# Patient Record
Sex: MALE | Race: WHITE | Employment: PART TIME | URBAN - METROPOLITAN AREA
[De-identification: names, ages, dates, MRNs, and addresses within clinical notes are randomized per-mention and may not be internally consistent; named-entity substitution may affect disease eponyms.]

---
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
## 2020-01-03 ENCOUNTER — ANESTHESIA (OUTPATIENT)
Dept: OPERATING ROOM | Age: 20
DRG: 482 | End: 2020-01-03
Payer: COMMERCIAL

## 2020-01-03 ENCOUNTER — ANESTHESIA EVENT (OUTPATIENT)
Dept: OPERATING ROOM | Age: 20
DRG: 482 | End: 2020-01-03
Payer: COMMERCIAL

## 2020-01-03 ENCOUNTER — TELEPHONE (OUTPATIENT)
Dept: ORTHOPEDIC SURGERY | Age: 20
End: 2020-01-03

## 2020-01-03 ENCOUNTER — APPOINTMENT (OUTPATIENT)
Dept: GENERAL RADIOLOGY | Age: 20
DRG: 482 | End: 2020-01-03
Payer: COMMERCIAL

## 2020-01-03 ENCOUNTER — HOSPITAL ENCOUNTER (INPATIENT)
Age: 20
LOS: 1 days | Discharge: HOME | DRG: 482 | End: 2020-01-04
Attending: EMERGENCY MEDICINE | Admitting: INTERNAL MEDICINE
Payer: COMMERCIAL

## 2020-01-03 VITALS — SYSTOLIC BLOOD PRESSURE: 101 MMHG | OXYGEN SATURATION: 100 % | TEMPERATURE: 76.5 F | DIASTOLIC BLOOD PRESSURE: 51 MMHG

## 2020-01-03 PROBLEM — S72.002B: Status: ACTIVE | Noted: 2020-01-03

## 2020-01-03 LAB
ABO/RH: NORMAL
ANION GAP SERPL CALCULATED.3IONS-SCNC: 11 MMOL/L (ref 3–16)
ANTIBODY SCREEN: NORMAL
BASOPHILS ABSOLUTE: 0.1 K/UL (ref 0–0.2)
BASOPHILS RELATIVE PERCENT: 0.5 %
BUN BLDV-MCNC: 21 MG/DL (ref 7–20)
CALCIUM SERPL-MCNC: 8.7 MG/DL (ref 8.3–10.6)
CHLORIDE BLD-SCNC: 102 MMOL/L (ref 99–110)
CO2: 24 MMOL/L (ref 21–32)
CREAT SERPL-MCNC: 0.7 MG/DL (ref 0.9–1.3)
EOSINOPHILS ABSOLUTE: 0.3 K/UL (ref 0–0.6)
EOSINOPHILS RELATIVE PERCENT: 2.4 %
GFR AFRICAN AMERICAN: >60
GFR NON-AFRICAN AMERICAN: >60
GLUCOSE BLD-MCNC: 107 MG/DL (ref 70–99)
GLUCOSE BLD-MCNC: 123 MG/DL (ref 70–99)
HCT VFR BLD CALC: 39.4 % (ref 40.5–52.5)
HEMOGLOBIN: 13.2 G/DL (ref 13.5–17.5)
LYMPHOCYTES ABSOLUTE: 1.6 K/UL (ref 1–5.1)
LYMPHOCYTES RELATIVE PERCENT: 13.4 %
MCH RBC QN AUTO: 29.5 PG (ref 26–34)
MCHC RBC AUTO-ENTMCNC: 33.5 G/DL (ref 31–36)
MCV RBC AUTO: 88 FL (ref 80–100)
MONOCYTES ABSOLUTE: 0.7 K/UL (ref 0–1.3)
MONOCYTES RELATIVE PERCENT: 6 %
NEUTROPHILS ABSOLUTE: 9.4 K/UL (ref 1.7–7.7)
NEUTROPHILS RELATIVE PERCENT: 77.7 %
PDW BLD-RTO: 13.9 % (ref 12.4–15.4)
PERFORMED ON: ABNORMAL
PLATELET # BLD: 242 K/UL (ref 135–450)
PMV BLD AUTO: 8.6 FL (ref 5–10.5)
POTASSIUM SERPL-SCNC: 3.6 MMOL/L (ref 3.5–5.1)
RBC # BLD: 4.48 M/UL (ref 4.2–5.9)
SODIUM BLD-SCNC: 137 MMOL/L (ref 136–145)
WBC # BLD: 12.1 K/UL (ref 4–11)

## 2020-01-03 PROCEDURE — 6360000002 HC RX W HCPCS: Performed by: FAMILY MEDICINE

## 2020-01-03 PROCEDURE — 2580000003 HC RX 258: Performed by: NURSE PRACTITIONER

## 2020-01-03 PROCEDURE — 99253 IP/OBS CNSLTJ NEW/EST LOW 45: CPT | Performed by: ORTHOPAEDIC SURGERY

## 2020-01-03 PROCEDURE — 2500000003 HC RX 250 WO HCPCS: Performed by: ORTHOPAEDIC SURGERY

## 2020-01-03 PROCEDURE — 2500000003 HC RX 250 WO HCPCS: Performed by: ANESTHESIOLOGY

## 2020-01-03 PROCEDURE — 6360000002 HC RX W HCPCS: Performed by: ORTHOPAEDIC SURGERY

## 2020-01-03 PROCEDURE — 1200000000 HC SEMI PRIVATE

## 2020-01-03 PROCEDURE — 36415 COLL VENOUS BLD VENIPUNCTURE: CPT

## 2020-01-03 PROCEDURE — 73552 X-RAY EXAM OF FEMUR 2/>: CPT

## 2020-01-03 PROCEDURE — 2580000003 HC RX 258: Performed by: ORTHOPAEDIC SURGERY

## 2020-01-03 PROCEDURE — 27506 TREATMENT OF THIGH FRACTURE: CPT | Performed by: ORTHOPAEDIC SURGERY

## 2020-01-03 PROCEDURE — C1769 GUIDE WIRE: HCPCS | Performed by: ORTHOPAEDIC SURGERY

## 2020-01-03 PROCEDURE — 2580000003 HC RX 258: Performed by: INTERNAL MEDICINE

## 2020-01-03 PROCEDURE — 3600000014 HC SURGERY LEVEL 4 ADDTL 15MIN: Performed by: ORTHOPAEDIC SURGERY

## 2020-01-03 PROCEDURE — 80048 BASIC METABOLIC PNL TOTAL CA: CPT

## 2020-01-03 PROCEDURE — 6370000000 HC RX 637 (ALT 250 FOR IP): Performed by: FAMILY MEDICINE

## 2020-01-03 PROCEDURE — 6370000000 HC RX 637 (ALT 250 FOR IP): Performed by: ORTHOPAEDIC SURGERY

## 2020-01-03 PROCEDURE — 86901 BLOOD TYPING SEROLOGIC RH(D): CPT

## 2020-01-03 PROCEDURE — 2709999900 HC NON-CHARGEABLE SUPPLY: Performed by: ORTHOPAEDIC SURGERY

## 2020-01-03 PROCEDURE — 0QS906Z REPOSITION LEFT FEMORAL SHAFT WITH INTRAMEDULLARY INTERNAL FIXATION DEVICE, OPEN APPROACH: ICD-10-PCS | Performed by: ORTHOPAEDIC SURGERY

## 2020-01-03 PROCEDURE — 85025 COMPLETE CBC W/AUTO DIFF WBC: CPT

## 2020-01-03 PROCEDURE — 2580000003 HC RX 258: Performed by: NURSE ANESTHETIST, CERTIFIED REGISTERED

## 2020-01-03 PROCEDURE — 99285 EMERGENCY DEPT VISIT HI MDM: CPT

## 2020-01-03 PROCEDURE — 86900 BLOOD TYPING SEROLOGIC ABO: CPT

## 2020-01-03 PROCEDURE — 7100000000 HC PACU RECOVERY - FIRST 15 MIN: Performed by: ORTHOPAEDIC SURGERY

## 2020-01-03 PROCEDURE — 2500000003 HC RX 250 WO HCPCS: Performed by: NURSE ANESTHETIST, CERTIFIED REGISTERED

## 2020-01-03 PROCEDURE — 2720000010 HC SURG SUPPLY STERILE: Performed by: ORTHOPAEDIC SURGERY

## 2020-01-03 PROCEDURE — 6360000002 HC RX W HCPCS: Performed by: NURSE ANESTHETIST, CERTIFIED REGISTERED

## 2020-01-03 PROCEDURE — 96374 THER/PROPH/DIAG INJ IV PUSH: CPT

## 2020-01-03 PROCEDURE — 3700000001 HC ADD 15 MINUTES (ANESTHESIA): Performed by: ORTHOPAEDIC SURGERY

## 2020-01-03 PROCEDURE — C1713 ANCHOR/SCREW BN/BN,TIS/BN: HCPCS | Performed by: ORTHOPAEDIC SURGERY

## 2020-01-03 PROCEDURE — 6360000002 HC RX W HCPCS

## 2020-01-03 PROCEDURE — 2780000010 HC IMPLANT OTHER: Performed by: ORTHOPAEDIC SURGERY

## 2020-01-03 PROCEDURE — 3600000004 HC SURGERY LEVEL 4 BASE: Performed by: ORTHOPAEDIC SURGERY

## 2020-01-03 PROCEDURE — 6360000002 HC RX W HCPCS: Performed by: NURSE PRACTITIONER

## 2020-01-03 PROCEDURE — 6360000002 HC RX W HCPCS: Performed by: ANESTHESIOLOGY

## 2020-01-03 PROCEDURE — 3209999900 FLUORO FOR SURGICAL PROCEDURES

## 2020-01-03 PROCEDURE — 86850 RBC ANTIBODY SCREEN: CPT

## 2020-01-03 PROCEDURE — 96375 TX/PRO/DX INJ NEW DRUG ADDON: CPT

## 2020-01-03 PROCEDURE — 7100000001 HC PACU RECOVERY - ADDTL 15 MIN: Performed by: ORTHOPAEDIC SURGERY

## 2020-01-03 PROCEDURE — 6360000002 HC RX W HCPCS: Performed by: INTERNAL MEDICINE

## 2020-01-03 PROCEDURE — 3700000000 HC ANESTHESIA ATTENDED CARE: Performed by: ORTHOPAEDIC SURGERY

## 2020-01-03 PROCEDURE — 73502 X-RAY EXAM HIP UNI 2-3 VIEWS: CPT

## 2020-01-03 DEVICE — SCREW BNE L70MM DIA5MM COR DIA4.3MM TIB TI ALUM NIOBIUM: Type: IMPLANTABLE DEVICE | Site: FEMUR | Status: FUNCTIONAL

## 2020-01-03 DEVICE — SCREW BNE L38MM DIA5MM TIB LT GRN TI ST CANN LOK FULL THRD: Type: IMPLANTABLE DEVICE | Site: FEMUR | Status: FUNCTIONAL

## 2020-01-03 DEVICE — SCREW BNE L48MM DIA5MM LAT FEM LT GRN TI ST LOK FULL THRD: Type: IMPLANTABLE DEVICE | Site: FEMUR | Status: FUNCTIONAL

## 2020-01-03 DEVICE — SCREW BNE L44MM DIA5MM COR DIA4.3MM TIB LT GRN TI ALLY ST: Type: IMPLANTABLE DEVICE | Site: FEMUR | Status: FUNCTIONAL

## 2020-01-03 RX ORDER — 0.9 % SODIUM CHLORIDE 0.9 %
500 INTRAVENOUS SOLUTION INTRAVENOUS
Status: DISCONTINUED | OUTPATIENT
Start: 2020-01-03 | End: 2020-01-03 | Stop reason: HOSPADM

## 2020-01-03 RX ORDER — ACETAMINOPHEN 500 MG
1000 TABLET ORAL EVERY 8 HOURS SCHEDULED
Status: DISCONTINUED | OUTPATIENT
Start: 2020-01-03 | End: 2020-01-04 | Stop reason: HOSPADM

## 2020-01-03 RX ORDER — PROCHLORPERAZINE EDISYLATE 5 MG/ML
5 INJECTION INTRAMUSCULAR; INTRAVENOUS
Status: DISCONTINUED | OUTPATIENT
Start: 2020-01-03 | End: 2020-01-03 | Stop reason: HOSPADM

## 2020-01-03 RX ORDER — MORPHINE SULFATE 2 MG/ML
2 INJECTION, SOLUTION INTRAMUSCULAR; INTRAVENOUS EVERY 4 HOURS PRN
Status: DISCONTINUED | OUTPATIENT
Start: 2020-01-03 | End: 2020-01-03 | Stop reason: SDUPTHER

## 2020-01-03 RX ORDER — GLYCOPYRROLATE 1 MG/5 ML
SYRINGE (ML) INTRAVENOUS PRN
Status: DISCONTINUED | OUTPATIENT
Start: 2020-01-03 | End: 2020-01-03 | Stop reason: SDUPTHER

## 2020-01-03 RX ORDER — MEPERIDINE HYDROCHLORIDE 25 MG/ML
12.5 INJECTION INTRAMUSCULAR; INTRAVENOUS; SUBCUTANEOUS EVERY 5 MIN PRN
Status: DISCONTINUED | OUTPATIENT
Start: 2020-01-03 | End: 2020-01-03 | Stop reason: HOSPADM

## 2020-01-03 RX ORDER — MAGNESIUM HYDROXIDE 1200 MG/15ML
LIQUID ORAL CONTINUOUS PRN
Status: COMPLETED | OUTPATIENT
Start: 2020-01-03 | End: 2020-01-03

## 2020-01-03 RX ORDER — CYCLOBENZAPRINE HCL 10 MG
10 TABLET ORAL 3 TIMES DAILY PRN
Status: DISCONTINUED | OUTPATIENT
Start: 2020-01-03 | End: 2020-01-04 | Stop reason: HOSPADM

## 2020-01-03 RX ORDER — SODIUM CHLORIDE 0.9 % (FLUSH) 0.9 %
10 SYRINGE (ML) INJECTION PRN
Status: DISCONTINUED | OUTPATIENT
Start: 2020-01-03 | End: 2020-01-04 | Stop reason: HOSPADM

## 2020-01-03 RX ORDER — LIDOCAINE HYDROCHLORIDE 20 MG/ML
INJECTION, SOLUTION INTRAVENOUS PRN
Status: DISCONTINUED | OUTPATIENT
Start: 2020-01-03 | End: 2020-01-03 | Stop reason: SDUPTHER

## 2020-01-03 RX ORDER — KETOROLAC TROMETHAMINE 15 MG/ML
15 INJECTION, SOLUTION INTRAMUSCULAR; INTRAVENOUS EVERY 6 HOURS
Status: DISCONTINUED | OUTPATIENT
Start: 2020-01-03 | End: 2020-01-04 | Stop reason: HOSPADM

## 2020-01-03 RX ORDER — MORPHINE SULFATE 2 MG/ML
4 INJECTION, SOLUTION INTRAMUSCULAR; INTRAVENOUS EVERY 4 HOURS PRN
Status: DISCONTINUED | OUTPATIENT
Start: 2020-01-03 | End: 2020-01-03

## 2020-01-03 RX ORDER — OXYCODONE HYDROCHLORIDE 5 MG/1
5 TABLET ORAL EVERY 4 HOURS PRN
Status: DISCONTINUED | OUTPATIENT
Start: 2020-01-03 | End: 2020-01-04 | Stop reason: HOSPADM

## 2020-01-03 RX ORDER — OXYCODONE HYDROCHLORIDE 5 MG/1
10 TABLET ORAL EVERY 4 HOURS PRN
Status: DISCONTINUED | OUTPATIENT
Start: 2020-01-03 | End: 2020-01-04 | Stop reason: HOSPADM

## 2020-01-03 RX ORDER — VECURONIUM BROMIDE 1 MG/ML
INJECTION, POWDER, LYOPHILIZED, FOR SOLUTION INTRAVENOUS PRN
Status: DISCONTINUED | OUTPATIENT
Start: 2020-01-03 | End: 2020-01-03 | Stop reason: SDUPTHER

## 2020-01-03 RX ORDER — ROCURONIUM BROMIDE 10 MG/ML
INJECTION, SOLUTION INTRAVENOUS PRN
Status: DISCONTINUED | OUTPATIENT
Start: 2020-01-03 | End: 2020-01-03 | Stop reason: SDUPTHER

## 2020-01-03 RX ORDER — FENTANYL CITRATE 50 UG/ML
50 INJECTION, SOLUTION INTRAMUSCULAR; INTRAVENOUS ONCE
Status: COMPLETED | OUTPATIENT
Start: 2020-01-03 | End: 2020-01-03

## 2020-01-03 RX ORDER — MORPHINE SULFATE 2 MG/ML
4 INJECTION, SOLUTION INTRAMUSCULAR; INTRAVENOUS
Status: DISCONTINUED | OUTPATIENT
Start: 2020-01-03 | End: 2020-01-03

## 2020-01-03 RX ORDER — PROPOFOL 10 MG/ML
INJECTION, EMULSION INTRAVENOUS PRN
Status: DISCONTINUED | OUTPATIENT
Start: 2020-01-03 | End: 2020-01-03 | Stop reason: SDUPTHER

## 2020-01-03 RX ORDER — MORPHINE SULFATE 2 MG/ML
2 INJECTION, SOLUTION INTRAMUSCULAR; INTRAVENOUS
Status: DISCONTINUED | OUTPATIENT
Start: 2020-01-03 | End: 2020-01-03

## 2020-01-03 RX ORDER — MORPHINE SULFATE 2 MG/ML
2 INJECTION, SOLUTION INTRAMUSCULAR; INTRAVENOUS EVERY 4 HOURS PRN
Status: DISCONTINUED | OUTPATIENT
Start: 2020-01-03 | End: 2020-01-03

## 2020-01-03 RX ORDER — HYDROCODONE BITARTRATE AND ACETAMINOPHEN 5; 325 MG/1; MG/1
1 TABLET ORAL
Status: DISCONTINUED | OUTPATIENT
Start: 2020-01-03 | End: 2020-01-03 | Stop reason: HOSPADM

## 2020-01-03 RX ORDER — HYDRALAZINE HYDROCHLORIDE 20 MG/ML
5 INJECTION INTRAMUSCULAR; INTRAVENOUS EVERY 10 MIN PRN
Status: DISCONTINUED | OUTPATIENT
Start: 2020-01-03 | End: 2020-01-03 | Stop reason: HOSPADM

## 2020-01-03 RX ORDER — MORPHINE SULFATE 2 MG/ML
4 INJECTION, SOLUTION INTRAMUSCULAR; INTRAVENOUS EVERY 4 HOURS PRN
Status: DISCONTINUED | OUTPATIENT
Start: 2020-01-03 | End: 2020-01-03 | Stop reason: SDUPTHER

## 2020-01-03 RX ORDER — NEOSTIGMINE METHYLSULFATE 5 MG/5 ML
SYRINGE (ML) INTRAVENOUS PRN
Status: DISCONTINUED | OUTPATIENT
Start: 2020-01-03 | End: 2020-01-03 | Stop reason: SDUPTHER

## 2020-01-03 RX ORDER — ONDANSETRON 2 MG/ML
INJECTION INTRAMUSCULAR; INTRAVENOUS PRN
Status: DISCONTINUED | OUTPATIENT
Start: 2020-01-03 | End: 2020-01-03 | Stop reason: SDUPTHER

## 2020-01-03 RX ORDER — ONDANSETRON 2 MG/ML
4 INJECTION INTRAMUSCULAR; INTRAVENOUS
Status: DISCONTINUED | OUTPATIENT
Start: 2020-01-03 | End: 2020-01-03 | Stop reason: HOSPADM

## 2020-01-03 RX ORDER — SODIUM CHLORIDE 0.9 % (FLUSH) 0.9 %
10 SYRINGE (ML) INJECTION EVERY 12 HOURS SCHEDULED
Status: DISCONTINUED | OUTPATIENT
Start: 2020-01-03 | End: 2020-01-04 | Stop reason: HOSPADM

## 2020-01-03 RX ORDER — HYDROMORPHONE HCL 110MG/55ML
PATIENT CONTROLLED ANALGESIA SYRINGE INTRAVENOUS PRN
Status: DISCONTINUED | OUTPATIENT
Start: 2020-01-03 | End: 2020-01-03 | Stop reason: SDUPTHER

## 2020-01-03 RX ORDER — ONDANSETRON 2 MG/ML
4 INJECTION INTRAMUSCULAR; INTRAVENOUS EVERY 6 HOURS PRN
Status: DISCONTINUED | OUTPATIENT
Start: 2020-01-03 | End: 2020-01-04 | Stop reason: HOSPADM

## 2020-01-03 RX ORDER — DEXAMETHASONE SODIUM PHOSPHATE 4 MG/ML
INJECTION, SOLUTION INTRA-ARTICULAR; INTRALESIONAL; INTRAMUSCULAR; INTRAVENOUS; SOFT TISSUE PRN
Status: DISCONTINUED | OUTPATIENT
Start: 2020-01-03 | End: 2020-01-03 | Stop reason: SDUPTHER

## 2020-01-03 RX ORDER — TRAMADOL HYDROCHLORIDE 50 MG/1
50 TABLET ORAL EVERY 4 HOURS PRN
Status: DISCONTINUED | OUTPATIENT
Start: 2020-01-03 | End: 2020-01-04 | Stop reason: HOSPADM

## 2020-01-03 RX ORDER — SODIUM CHLORIDE, SODIUM LACTATE, POTASSIUM CHLORIDE, CALCIUM CHLORIDE 600; 310; 30; 20 MG/100ML; MG/100ML; MG/100ML; MG/100ML
INJECTION, SOLUTION INTRAVENOUS CONTINUOUS PRN
Status: DISCONTINUED | OUTPATIENT
Start: 2020-01-03 | End: 2020-01-03 | Stop reason: SDUPTHER

## 2020-01-03 RX ORDER — ACETAMINOPHEN 325 MG/1
650 TABLET ORAL EVERY 4 HOURS PRN
Status: DISCONTINUED | OUTPATIENT
Start: 2020-01-03 | End: 2020-01-03

## 2020-01-03 RX ORDER — DIPHENHYDRAMINE HYDROCHLORIDE 50 MG/ML
12.5 INJECTION INTRAMUSCULAR; INTRAVENOUS
Status: DISCONTINUED | OUTPATIENT
Start: 2020-01-03 | End: 2020-01-03 | Stop reason: HOSPADM

## 2020-01-03 RX ORDER — BUPIVACAINE HYDROCHLORIDE 5 MG/ML
INJECTION, SOLUTION EPIDURAL; INTRACAUDAL PRN
Status: DISCONTINUED | OUTPATIENT
Start: 2020-01-03 | End: 2020-01-03 | Stop reason: ALTCHOICE

## 2020-01-03 RX ADMIN — PROPOFOL 100 MG: 10 INJECTION, EMULSION INTRAVENOUS at 17:02

## 2020-01-03 RX ADMIN — PROPOFOL 200 MG: 10 INJECTION, EMULSION INTRAVENOUS at 16:59

## 2020-01-03 RX ADMIN — LIDOCAINE HYDROCHLORIDE 100 MG: 20 INJECTION, SOLUTION INTRAVENOUS at 16:59

## 2020-01-03 RX ADMIN — HYDROMORPHONE HYDROCHLORIDE 1 MG: 1 INJECTION, SOLUTION INTRAMUSCULAR; INTRAVENOUS; SUBCUTANEOUS at 23:11

## 2020-01-03 RX ADMIN — Medication 3 MG: at 19:37

## 2020-01-03 RX ADMIN — HYDROMORPHONE HYDROCHLORIDE 1 MG: 2 INJECTION, SOLUTION INTRAMUSCULAR; INTRAVENOUS; SUBCUTANEOUS at 16:53

## 2020-01-03 RX ADMIN — HYDROMORPHONE HYDROCHLORIDE 1 MG: 1 INJECTION, SOLUTION INTRAMUSCULAR; INTRAVENOUS; SUBCUTANEOUS at 12:29

## 2020-01-03 RX ADMIN — VECURONIUM BROMIDE FOR INJECTION 2 MG: 1 INJECTION, POWDER, LYOPHILIZED, FOR SOLUTION INTRAVENOUS at 17:20

## 2020-01-03 RX ADMIN — VECURONIUM BROMIDE FOR INJECTION 1 MG: 1 INJECTION, POWDER, LYOPHILIZED, FOR SOLUTION INTRAVENOUS at 17:47

## 2020-01-03 RX ADMIN — DEXAMETHASONE SODIUM PHOSPHATE 8 MG: 4 INJECTION, SOLUTION INTRAMUSCULAR; INTRAVENOUS at 17:05

## 2020-01-03 RX ADMIN — MORPHINE SULFATE 4 MG: 2 INJECTION, SOLUTION INTRAMUSCULAR; INTRAVENOUS at 06:30

## 2020-01-03 RX ADMIN — OXYCODONE 10 MG: 5 TABLET ORAL at 21:24

## 2020-01-03 RX ADMIN — HYDROMORPHONE HYDROCHLORIDE 0.5 MG: 1 INJECTION, SOLUTION INTRAMUSCULAR; INTRAVENOUS; SUBCUTANEOUS at 20:00

## 2020-01-03 RX ADMIN — Medication 10 ML: at 09:40

## 2020-01-03 RX ADMIN — SODIUM CHLORIDE, SODIUM LACTATE, POTASSIUM CHLORIDE, AND CALCIUM CHLORIDE: 600; 310; 30; 20 INJECTION, SOLUTION INTRAVENOUS at 16:53

## 2020-01-03 RX ADMIN — MORPHINE SULFATE 4 MG: 2 INJECTION, SOLUTION INTRAMUSCULAR; INTRAVENOUS at 02:30

## 2020-01-03 RX ADMIN — CYCLOBENZAPRINE 10 MG: 10 TABLET, FILM COATED ORAL at 10:44

## 2020-01-03 RX ADMIN — CEFAZOLIN 2 G: 1 INJECTION, POWDER, FOR SOLUTION INTRAMUSCULAR; INTRAVENOUS at 17:05

## 2020-01-03 RX ADMIN — ACETAMINOPHEN 1000 MG: 500 TABLET ORAL at 22:08

## 2020-01-03 RX ADMIN — Medication 1 MG: at 00:56

## 2020-01-03 RX ADMIN — TRANEXAMIC ACID 1 G: 1 INJECTION, SOLUTION INTRAVENOUS at 17:10

## 2020-01-03 RX ADMIN — HYDROMORPHONE HYDROCHLORIDE 1 MG: 1 INJECTION, SOLUTION INTRAMUSCULAR; INTRAVENOUS; SUBCUTANEOUS at 15:27

## 2020-01-03 RX ADMIN — ONDANSETRON 8 MG: 2 INJECTION INTRAMUSCULAR; INTRAVENOUS at 17:05

## 2020-01-03 RX ADMIN — ROCURONIUM BROMIDE 50 MG: 10 INJECTION, SOLUTION INTRAVENOUS at 16:59

## 2020-01-03 RX ADMIN — Medication 0.4 MG: at 19:37

## 2020-01-03 RX ADMIN — HYDROMORPHONE HYDROCHLORIDE 1 MG: 1 INJECTION, SOLUTION INTRAMUSCULAR; INTRAVENOUS; SUBCUTANEOUS at 00:56

## 2020-01-03 RX ADMIN — KETOROLAC TROMETHAMINE 15 MG: 15 INJECTION, SOLUTION INTRAMUSCULAR; INTRAVENOUS at 22:08

## 2020-01-03 RX ADMIN — Medication 10 ML: at 21:25

## 2020-01-03 RX ADMIN — MORPHINE SULFATE 4 MG: 2 INJECTION, SOLUTION INTRAMUSCULAR; INTRAVENOUS at 09:43

## 2020-01-03 RX ADMIN — FENTANYL CITRATE 50 MCG: 50 INJECTION, SOLUTION INTRAMUSCULAR; INTRAVENOUS at 00:23

## 2020-01-03 ASSESSMENT — PULMONARY FUNCTION TESTS
PIF_VALUE: 16
PIF_VALUE: 1
PIF_VALUE: 16
PIF_VALUE: 19
PIF_VALUE: 16
PIF_VALUE: 16
PIF_VALUE: 18
PIF_VALUE: 16
PIF_VALUE: 2
PIF_VALUE: 16
PIF_VALUE: 18
PIF_VALUE: 18
PIF_VALUE: 16
PIF_VALUE: 16
PIF_VALUE: 0
PIF_VALUE: 1
PIF_VALUE: 16
PIF_VALUE: 2
PIF_VALUE: 16
PIF_VALUE: 5
PIF_VALUE: 2
PIF_VALUE: 16
PIF_VALUE: 12
PIF_VALUE: 16
PIF_VALUE: 18
PIF_VALUE: 18
PIF_VALUE: 16
PIF_VALUE: 1
PIF_VALUE: 21
PIF_VALUE: 16
PIF_VALUE: 5
PIF_VALUE: 16
PIF_VALUE: 1
PIF_VALUE: 16
PIF_VALUE: 16
PIF_VALUE: 21
PIF_VALUE: 16
PIF_VALUE: 1
PIF_VALUE: 16
PIF_VALUE: 3
PIF_VALUE: 16
PIF_VALUE: 3
PIF_VALUE: 2
PIF_VALUE: 16
PIF_VALUE: 18
PIF_VALUE: 16
PIF_VALUE: 4
PIF_VALUE: 16
PIF_VALUE: 1
PIF_VALUE: 16
PIF_VALUE: 15
PIF_VALUE: 16
PIF_VALUE: 2
PIF_VALUE: 16
PIF_VALUE: 17
PIF_VALUE: 16
PIF_VALUE: 18
PIF_VALUE: 21
PIF_VALUE: 16
PIF_VALUE: 0
PIF_VALUE: 16
PIF_VALUE: 16
PIF_VALUE: 0
PIF_VALUE: 16
PIF_VALUE: 18
PIF_VALUE: 16
PIF_VALUE: 16
PIF_VALUE: 15
PIF_VALUE: 16
PIF_VALUE: 16

## 2020-01-03 ASSESSMENT — PAIN - FUNCTIONAL ASSESSMENT

## 2020-01-03 ASSESSMENT — PAIN DESCRIPTION - PAIN TYPE
TYPE: SURGICAL PAIN
TYPE: ACUTE PAIN
TYPE: SURGICAL PAIN
TYPE: ACUTE PAIN
TYPE: SURGICAL PAIN
TYPE: ACUTE PAIN

## 2020-01-03 ASSESSMENT — PAIN DESCRIPTION - PROGRESSION
CLINICAL_PROGRESSION: NOT CHANGED

## 2020-01-03 ASSESSMENT — PAIN SCALES - GENERAL
PAINLEVEL_OUTOF10: 6
PAINLEVEL_OUTOF10: 6
PAINLEVEL_OUTOF10: 0
PAINLEVEL_OUTOF10: 7
PAINLEVEL_OUTOF10: 8
PAINLEVEL_OUTOF10: 9
PAINLEVEL_OUTOF10: 9
PAINLEVEL_OUTOF10: 6
PAINLEVEL_OUTOF10: 0
PAINLEVEL_OUTOF10: 8

## 2020-01-03 ASSESSMENT — PAIN DESCRIPTION - FREQUENCY
FREQUENCY: CONTINUOUS

## 2020-01-03 ASSESSMENT — PAIN DESCRIPTION - ONSET
ONSET: ON-GOING
ONSET: SUDDEN
ONSET: ON-GOING

## 2020-01-03 ASSESSMENT — PAIN DESCRIPTION - ORIENTATION
ORIENTATION: LEFT

## 2020-01-03 ASSESSMENT — PAIN DESCRIPTION - LOCATION
LOCATION: HIP
LOCATION: HIP;LEG
LOCATION: HIP
LOCATION: LEG
LOCATION: HIP;LEG
LOCATION: HIP

## 2020-01-03 ASSESSMENT — PAIN DESCRIPTION - DESCRIPTORS
DESCRIPTORS: BURNING;SHARP
DESCRIPTORS: ACHING;SHARP
DESCRIPTORS: ACHING;SHARP
DESCRIPTORS: SHARP;SHOOTING
DESCRIPTORS: ACHING;SHARP
DESCRIPTORS: SHARP;TENDER
DESCRIPTORS: ACHING;SHARP
DESCRIPTORS: ACHING

## 2020-01-03 ASSESSMENT — LIFESTYLE VARIABLES: SMOKING_STATUS: 0

## 2020-01-03 NOTE — PROGRESS NOTES
Patient arrived from  to PACU for pre-op surgery. Awaiting 's arrival. Anesthesia spoke to patient and mother.

## 2020-01-03 NOTE — PROGRESS NOTES
4 Eyes Admission Assessment     I agree as the admission nurse that 2 RN's have performed a thorough Head to Toe Skin Assessment on the patient. ALL assessment sites listed below have been assessed on admission. Areas assessed by both nurses:   [x]   Head, Face, and Ears   [x]   Shoulders, Back, and Chest  [x]   Arms, Elbows, and Hands   [x]   Coccyx, Sacrum, and Ischum  [x]   Legs, Feet, and Heels        Does the Patient have Skin Breakdown?   No        Abrasion rt eye  Jagjit Prevention initiated:  No   Wound Care Orders initiated:  No      [facility] nurse consulted for Pressure Injury (Stage 3,4, Unstageable, DTI, NWPT, and Complex wounds):  No      Nurse 1 eSignature: Electronically signed by Tere Krishnamurthy RN on 1/3/20 at 4:20 AM    **SHARE this note so that the co-signing nurse is able to place an eSignature**    Nurse 2 eSignature: Electronically signed by Amalia Godwin RN on 1/3/20 at 4:21 AM

## 2020-01-03 NOTE — PROGRESS NOTES
Hospitalist follow up admit >12 am:  Chart reviewed. Pt. Is a 22 yo M who sustained a mid femur fracture after landing on his left leg after a fall. Ortho. Has been consulted and surgical intervention is recommended. Pain mgmt. Will cont. To monitor.   Carter Hernandes  Hospitalist.

## 2020-01-03 NOTE — TELEPHONE ENCOUNTER
Patients dad called this am re: Massiel Monsalve who is in the ER at [facility] Patient fractured femur-will require sx-Jose, the father requested to speak to Dr Laurie Rodriges who is a personal friend. Texted Dr Laurie Rodriges [facility] cell phone # [phone].

## 2020-01-03 NOTE — ED TRIAGE NOTES
Pt came in via squad. Pt was wrestling in the basement with a friend and landed on the ground./ Pt thought he heard a snap. Pt L leg is in 9 rated pain. Pt does  Have a pedal pulse.

## 2020-01-03 NOTE — PLAN OF CARE
Ortho POC for: Left femur fracture    HPI: The patient reports that last night he was at a friend's house who he was wrestling with in the basement. While wrestling, his friend flipped him over onto his left side and he immediately knee he had injured himself. He was unable to get up and had his friends call 911. He was brought here by squad. The patient is currently not in school, he works at a car wash. He is resting in bed, appears comfortable at this time. Reports pain is controlled intermittently. Father at bedside. Examination: Left thigh with swelling and taut. Compartments soft however. Deformity noted to left thigh. Tenderness with light palpation. LLE ER at rest with knee slightly flexed. NVI. Moving foot and ankle, good pedal pulse. Foot warm with good cap refill. Radiology:   XR HIP 2-3 VW W PELVIS LEFT   Final Result     Comminuted, displaced fractures of the mid left femur. Assessment: Left femur fracture    Plan: Our recommendation is for surgical intervention. Plan for OR at 36 with Dr. Cathy Kaiser for left femur IM nail. RN to verify consent, remain NPO, Ancef on call to OR. Medicine has cleared patient for OR. Discussed POC with pt and his father and all questions answered; they reported they had already reviewed imaging. Full consult note to follow. Discussed with Dr. Cathy Kaiser. All in agreement.     Linda Gibbs, CNP

## 2020-01-03 NOTE — H&P
Pulse 87   Temp 97.4 °F (36.3 °C) (Axillary)   Resp 16   SpO2 97%     General appearance:  Appears comfortable. Well nourished  Eyes: Sclera clear, pupils equal  ENT: Moist mucus membranes, no thrush. Trachea midline. Cardiovascular: Regular rhythm, normal S1, S2. No murmur, gallop, rub. No edema in lower extremities  Respiratory: Clear to auscultation bilaterally, no wheeze, good inspiratory effort  Gastrointestinal: Abdomen soft, non-tender, not distended, normal bowel sounds  Musculoskeletal: No cyanosis in digits, neck supple  Neurology: Cranial nerves grossly intact. Alert and oriented in time, place and person. No speech or motor deficits  Psychiatry: Appropriate affect. Not agitated  Skin: Warm, dry, normal turgor, no rash  Brisk capillary refill, peripheral pulses palpable   Labs:  CBC:   Lab Results   Component Value Date    WBC 12.1 01/03/2020    RBC 4.48 01/03/2020    HGB 13.2 01/03/2020    HCT 39.4 01/03/2020    MCV 88.0 01/03/2020    MCH 29.5 01/03/2020    MCHC 33.5 01/03/2020    RDW 13.9 01/03/2020     01/03/2020    MPV 8.6 01/03/2020     BMP:    Lab Results   Component Value Date     01/03/2020    K 3.6 01/03/2020     01/03/2020    CO2 24 01/03/2020    BUN 21 01/03/2020    CREATININE 0.7 01/03/2020    CALCIUM 8.7 01/03/2020    GFRAA >60 01/03/2020    LABGLOM >60 01/03/2020    GLUCOSE 123 01/03/2020     XR HIP 2-3 VW W PELVIS LEFT   Final Result     Comminuted, displaced fractures of the mid left femur.             Discussed case  with ER provider over the phone and in person  Problem List  Left femur fracture  Leukocytosis    Assessment/Plan:   Comminuted displaced mid left femur fracture  N.p.o., PRN IV opioids, Ortho consult, Pierson's traction with 15 pounds, no contraindications to surgery      DVT prophylaxis low risk, SCDs  Code status full code  Diet n.p.o.  IV access peripheral  Mayorga Catheter no    Admit as inpatient/. I anticipate hospitalization spanning more than two

## 2020-01-03 NOTE — ED NOTES
Bed: A03-03  Expected date:   Expected time:   Means of arrival:   Comments:  Iris Gutierrez RN  01/03/20 8115

## 2020-01-03 NOTE — ED PROVIDER NOTES
murmurs, rubs, gallops     Abdomen: No gross distension. Soft, non-tender, non-peritoneal.     : Deferred. Musculoskeletal: No obvious joint deformity. Ambulates without difficulty. Patient is laying on his right side and is somewhat difficult to assess, though does have an apparent deformity to left mid thigh. Difficult to ascertain whether the leg is malrotated or foreshortened. He will not allow me to move the leg. He does have strong and equal DP and PT pulses bilaterally. Neuro: A&O x 4. Normal speech without dysarthria or aphasia. Moves all extremities spontaneously and symmetrically. Gait normal without ataxia. Skin: Warm, dry. No obvious rashes, petechiae, or purpura. Psych: Appropriate mood and affect, normal interaction. Diagnostic Results     RADIOLOGY:  XR HIP 2-3 VW W PELVIS LEFT    (Results Pending)     LABS:   No results found for this visit on 01/03/20. RECENT VITALS:  BP: 109/63, Temp: 97.3 °F (36.3 °C), Pulse: 87, Resp: 18, SpO2: 95 %     Procedures     None     ED Course     Nursing Notes, Past Medical Hx, Past Surgical Hx, Social Hx, Allergies, and Family Hx were reviewed. The patient was given the following medications:  Orders Placed This Encounter   Medications    fentaNYL (SUBLIMAZE) injection 50 mcg    HYDROmorphone (DILAUDID) injection 1 mg    HYDROmorphone (DILAUDID) 1 MG/ML injection     Christie Barboza: cabinet override            CONSULTS:  Teagan Noel4 / ASSESSMENT / PLAN     Briefly, Irene Gonzalez is a 23 y.o. male who presented to the emergency department with leg pain. On.  He arrives via EMS. On presentation, he is uncomfortable appearing, in mild distress. He is afebrile with stable vital signs. On exam, does appear to have a deformity to the left lateral mid thigh. He is neurovascularly intact distally.   Cannot ascertain whether the leg is foreshortened or malrotated as the patient will not allow me to move or further assess the injury. X-rays of the femur, hip, and pelvis were obtained which showed comminuted, significantly displaced midshaft femur fracture. 50 fentanyl was given initially for pain subsequently required 1 mg Dilaudid to facilitate his imaging. I spoke with Dr. Spencer Li of orthopedics who would like patient to be placed in Pierson's traction with 15 pounds admitted to the hospitalist.  He will see him tomorrow. I sent basic labs and type and screen. I spoke with the hospitalist for admission. At this point in time, patient will require admission to the hospital for further management of their clinical condition. I spoke with the admitting team who is in agreement with plan for admission. Patient and family are in agreement with plan for admission. Patient will be cared for in the ED prior to a bed becoming available upstairs    The patient was evaluated by myself and the ED Attending Physician, Dr. Aris Reddy. All management and disposition plans were discussed and agreed upon. Clinical Impression     1.  Closed displaced comminuted fracture of shaft of left femur, initial encounter [facility])      Disposition     Admission    Tae Potts CNP  Department of Emergency Medicine     LETTY Estrada - [facility]  01/03/20 1371

## 2020-01-03 NOTE — ANESTHESIA PRE PROCEDURE
DO        HYDROcodone-acetaminophen (NORCO) 5-325 MG per tablet 1 tablet  1 tablet Oral Once PRN Sherra Elise, DO        diphenhydrAMINE (BENADRYL) injection 12.5 mg  12.5 mg Intravenous Once PRN Sherra Elise, DO        0.9 % sodium chloride bolus  500 mL Intravenous Once PRN Sherra Elise, DO        ondansetron [facility]) injection 4 mg  4 mg Intravenous Once PRN Sherra Elise, DO        prochlorperazine (COMPAZINE) injection 5 mg  5 mg Intravenous Once PRN Sherra Elise, DO        hydrALAZINE (APRESOLINE) injection 5 mg  5 mg Intravenous Q10 Min PRN Sherra Elise, DO        meperidine (DEMEROL) injection 12.5 mg  12.5 mg Intravenous Q5 Min PRN Sherra Elise, DO           Allergies: Allergies   Allergen Reactions    Penicillins        Problem List:    Patient Active Problem List   Diagnosis Code    Hip fracture, left, open type I or II, [facility] S72.002B       Past Medical History:  History reviewed. No pertinent past medical history. Past Surgical History:  History reviewed. No pertinent surgical history.     Social History:    Social History     Tobacco Use    Smoking status: Never Smoker   Substance Use Topics    Alcohol use: Yes     Comment: occassional                                Counseling given: Not Answered      Vital Signs (Current):   Vitals:    01/03/20 0630 01/03/20 0645 01/03/20 1040 01/03/20 1525   BP:  122/66 (!) 144/87 (!) 142/88   Pulse:  70 68 72   Resp:  16 16 16   Temp:  98.2 °F (36.8 °C) 98.8 °F (37.1 °C) 98.1 °F (36.7 °C)   TempSrc:  Axillary Oral Oral   SpO2:  100% 95% 98%   Weight: 135 lb (61.2 kg)      Height: 5' 10\" (1.778 m)                                                 BP Readings from Last 3 Encounters:   01/03/20 (!) 142/88       NPO Status: Time of last liquid consumption: 2350                        Time of last solid consumption: 1509                        Date of last liquid consumption: 01/02/20                        Date

## 2020-01-03 NOTE — PLAN OF CARE
Problem: Falls - Risk of:  Goal: Will remain free from falls  Outcome: Ongoing  Note:   Pt is a medium fall risk. Fall precautions in place: Bed in locked lowest position, call light in reach, and non skid socks on. Will continue to monitor for patient safety. Problem: Pain:  Goal: Pain level will decrease  Note:   Pt complains of left leg pain. PRN Morphine given. Pt resting comfortably at this time. Will continue to monitor.

## 2020-01-04 VITALS
RESPIRATION RATE: 18 BRPM | DIASTOLIC BLOOD PRESSURE: 62 MMHG | OXYGEN SATURATION: 94 % | TEMPERATURE: 98.6 F | BODY MASS INDEX: 19.33 KG/M2 | SYSTOLIC BLOOD PRESSURE: 114 MMHG | HEART RATE: 87 BPM | WEIGHT: 135 LBS | HEIGHT: 70 IN

## 2020-01-04 LAB
ANION GAP SERPL CALCULATED.3IONS-SCNC: 10 MMOL/L (ref 3–16)
BUN BLDV-MCNC: 12 MG/DL (ref 7–20)
CALCIUM SERPL-MCNC: 8.9 MG/DL (ref 8.3–10.6)
CHLORIDE BLD-SCNC: 99 MMOL/L (ref 99–110)
CO2: 27 MMOL/L (ref 21–32)
CREAT SERPL-MCNC: 0.7 MG/DL (ref 0.9–1.3)
GFR AFRICAN AMERICAN: >60
GFR NON-AFRICAN AMERICAN: >60
GLUCOSE BLD-MCNC: 138 MG/DL (ref 70–99)
HCT VFR BLD CALC: 29.3 % (ref 40.5–52.5)
HEMOGLOBIN: 10.1 G/DL (ref 13.5–17.5)
MCH RBC QN AUTO: 30.2 PG (ref 26–34)
MCHC RBC AUTO-ENTMCNC: 34.3 G/DL (ref 31–36)
MCV RBC AUTO: 88.2 FL (ref 80–100)
PDW BLD-RTO: 13.3 % (ref 12.4–15.4)
PLATELET # BLD: 184 K/UL (ref 135–450)
PMV BLD AUTO: 8.6 FL (ref 5–10.5)
POTASSIUM REFLEX MAGNESIUM: 4.7 MMOL/L (ref 3.5–5.1)
RBC # BLD: 3.33 M/UL (ref 4.2–5.9)
SODIUM BLD-SCNC: 136 MMOL/L (ref 136–145)
WBC # BLD: 10.2 K/UL (ref 4–11)

## 2020-01-04 PROCEDURE — 2580000003 HC RX 258: Performed by: ORTHOPAEDIC SURGERY

## 2020-01-04 PROCEDURE — 97530 THERAPEUTIC ACTIVITIES: CPT

## 2020-01-04 PROCEDURE — 97166 OT EVAL MOD COMPLEX 45 MIN: CPT

## 2020-01-04 PROCEDURE — 6360000002 HC RX W HCPCS: Performed by: ORTHOPAEDIC SURGERY

## 2020-01-04 PROCEDURE — 80048 BASIC METABOLIC PNL TOTAL CA: CPT

## 2020-01-04 PROCEDURE — 97116 GAIT TRAINING THERAPY: CPT

## 2020-01-04 PROCEDURE — 99024 POSTOP FOLLOW-UP VISIT: CPT | Performed by: ORTHOPAEDIC SURGERY

## 2020-01-04 PROCEDURE — 2580000003 HC RX 258

## 2020-01-04 PROCEDURE — 97535 SELF CARE MNGMENT TRAINING: CPT

## 2020-01-04 PROCEDURE — 6370000000 HC RX 637 (ALT 250 FOR IP): Performed by: ORTHOPAEDIC SURGERY

## 2020-01-04 PROCEDURE — 85027 COMPLETE CBC AUTOMATED: CPT

## 2020-01-04 RX ORDER — SODIUM CHLORIDE 9 MG/ML
INJECTION, SOLUTION INTRAVENOUS
Status: COMPLETED
Start: 2020-01-04 | End: 2020-01-04

## 2020-01-04 RX ORDER — OXYCODONE HYDROCHLORIDE AND ACETAMINOPHEN 5; 325 MG/1; MG/1
1 TABLET ORAL EVERY 6 HOURS PRN
Qty: 28 TABLET | Refills: 0 | Status: SHIPPED | OUTPATIENT
Start: 2020-01-04 | End: 2020-01-11

## 2020-01-04 RX ADMIN — ACETAMINOPHEN 1000 MG: 500 TABLET ORAL at 06:30

## 2020-01-04 RX ADMIN — KETOROLAC TROMETHAMINE 15 MG: 15 INJECTION, SOLUTION INTRAMUSCULAR; INTRAVENOUS at 04:01

## 2020-01-04 RX ADMIN — KETOROLAC TROMETHAMINE 15 MG: 15 INJECTION, SOLUTION INTRAMUSCULAR; INTRAVENOUS at 10:07

## 2020-01-04 RX ADMIN — SODIUM CHLORIDE: 900 INJECTION, SOLUTION INTRAVENOUS at 10:45

## 2020-01-04 RX ADMIN — HYDROMORPHONE HYDROCHLORIDE 1 MG: 1 INJECTION, SOLUTION INTRAMUSCULAR; INTRAVENOUS; SUBCUTANEOUS at 02:16

## 2020-01-04 RX ADMIN — OXYCODONE 10 MG: 5 TABLET ORAL at 01:10

## 2020-01-04 RX ADMIN — HYDROMORPHONE HYDROCHLORIDE 1 MG: 1 INJECTION, SOLUTION INTRAMUSCULAR; INTRAVENOUS; SUBCUTANEOUS at 05:39

## 2020-01-04 RX ADMIN — OXYCODONE 10 MG: 5 TABLET ORAL at 11:05

## 2020-01-04 RX ADMIN — ENOXAPARIN SODIUM 40 MG: 40 INJECTION SUBCUTANEOUS at 09:52

## 2020-01-04 RX ADMIN — Medication 10 ML: at 10:07

## 2020-01-04 RX ADMIN — CEFAZOLIN 2 G: 1 INJECTION, POWDER, FOR SOLUTION INTRAMUSCULAR; INTRAVENOUS at 10:24

## 2020-01-04 RX ADMIN — OXYCODONE 10 MG: 5 TABLET ORAL at 06:30

## 2020-01-04 RX ADMIN — CEFAZOLIN 2 G: 1 INJECTION, POWDER, FOR SOLUTION INTRAMUSCULAR; INTRAVENOUS at 01:10

## 2020-01-04 ASSESSMENT — PAIN DESCRIPTION - FREQUENCY
FREQUENCY: CONTINUOUS

## 2020-01-04 ASSESSMENT — PAIN SCALES - GENERAL
PAINLEVEL_OUTOF10: 7
PAINLEVEL_OUTOF10: 3
PAINLEVEL_OUTOF10: 7
PAINLEVEL_OUTOF10: 0
PAINLEVEL_OUTOF10: 0
PAINLEVEL_OUTOF10: 5
PAINLEVEL_OUTOF10: 0
PAINLEVEL_OUTOF10: 7
PAINLEVEL_OUTOF10: 7

## 2020-01-04 ASSESSMENT — PAIN DESCRIPTION - ORIENTATION
ORIENTATION: LEFT

## 2020-01-04 ASSESSMENT — PAIN DESCRIPTION - LOCATION
LOCATION: HIP;LEG

## 2020-01-04 ASSESSMENT — PAIN DESCRIPTION - DESCRIPTORS
DESCRIPTORS: ACHING;SHARP
DESCRIPTORS: ACHING
DESCRIPTORS: ACHING;SHARP

## 2020-01-04 ASSESSMENT — PAIN DESCRIPTION - PROGRESSION
CLINICAL_PROGRESSION: NOT CHANGED

## 2020-01-04 ASSESSMENT — PAIN DESCRIPTION - PAIN TYPE
TYPE: SURGICAL PAIN

## 2020-01-04 ASSESSMENT — PAIN DESCRIPTION - ONSET
ONSET: ON-GOING

## 2020-01-04 NOTE — PROGRESS NOTES
sensory/motor deficits. Cranial nerves: II-XII intact, grossly non-focal.  Psychiatric: Alert and oriented, thought content appropriate, normal insight  Capillary Refill: Brisk,< 3 seconds   Peripheral Pulses: +2 palpable, equal bilaterally       Labs:   Recent Labs     01/03/20  0119 01/04/20  0624   WBC 12.1* 10.2   HGB 13.2* 10.1*   HCT 39.4* 29.3*    184     Recent Labs     01/03/20  0119 01/04/20  0624    136   K 3.6 4.7    99   CO2 24 27   BUN 21* 12   CREATININE 0.7* 0.7*   CALCIUM 8.7 8.9     No results for input(s): AST, ALT, BILIDIR, BILITOT, ALKPHOS in the last 72 hours. No results for input(s): INR in the last 72 hours. No results for input(s): Erven Sell in the last 72 hours. Urinalysis:    No results found for: Gwenette Leys, BACTERIA, RBCUA, BLOODU, SPECGRAV, Cris [place] [address]    Radiology:  XR FEMUR LEFT (MIN 2 VIEWS)   Final Result      Status post ORIF of the left femur. FLUORO FOR SURGICAL PROCEDURES   Final Result      Status post ORIF of the left femur. XR FEMUR LEFT (MIN 2 VIEWS)   Final Result      Status post ORIF of the left femur. XR HIP 2-3 VW W PELVIS LEFT   Final Result     Comminuted, displaced fractures of the mid left femur. Assessment/Plan:    Active Hospital Problems    Diagnosis    Hip fracture, left, open type I or II, initial encounter ([facility] [S72.002B]    Closed displaced comminuted fracture of shaft of left femur ([facility] [S72.352A]     1. This 59-year-old male admitted with a left leg pain he was wrestling with a friend in the basement and had a fall landing on his left leg x-ray in the emergency room did show mid femur fracture  1. Nondisplaced Salter-Dejesus type 2 fracture of the femur. A second small microtrabecular    osteochondral fracture of the anterior medial femoral condyle epiphysis.     2. No meniscal tear or acute ligamentous injury.        Orthopedic was consulted is status post repair on 1/3/2020,

## 2020-01-04 NOTE — DISCHARGE INSTR - COC
Belongings:[id number]}    RN SIGNATURE:  {Esignature:[id number]}    CASE MANAGEMENT/SOCIAL WORK SECTION    Inpatient Status Date: ***    Readmission Risk Assessment Score:  Readmission Risk              Risk of Unplanned Readmission:        5           Discharging to Facility/ Agency   · Name:   · Address:  · Phone:  · Fax:    Dialysis Facility (if applicable)   · Name:  · Address:  · Dialysis Schedule:  · Phone:  · Fax:    / signature: {Esignature:[id number]}    PHYSICIAN SECTION    Prognosis: Good    Condition at Discharge: Stable    Rehab Potential (if transferring to Rehab): Good    Recommended Labs or Other Treatments After Discharge: Follow-up with orthopedic as instructed    Physician Certification: I certify the above information and transfer of Deanna Arnold  is necessary for the continuing treatment of the diagnosis listed and that he requires [address] for less 30 days.      Update Admission H&P: No change in H&P    PHYSICIAN SIGNATURE:  Electronically signed by Soraya Deshpande MD on 1/4/20 at 12:40 PM

## 2020-01-04 NOTE — CONSULTS
Ortho Consult Note    CC: left femur fracture    HPI: 23year old male was wrestling last night with friends when he was flipped over his friend's back onto a hard floor. The patient landed on his leg in an awkward way and experienced immediate left thigh pain and inability to bear weight. Upon presentation to the ED at the [facility] radiographs demonstrated a closed left displaced/comminuted femoral shaft fracture. Orthopedic surgery was consulted for evaluation and management. The patient denied any other pain or injury. I reviewed this patient's past medical, surgical, social, and family history as well as the patient's current home medications, allergies, and ROS from the patient's fully dictated admission H&P performed on 1/2 by Dr. Medina Gloria. I agree with all documented findings and have no additions. PE:  AAOx3; NAD  LLE skin intact without abrasions, lacerations  Minimal left thigh echymossis  No gross deformity  Pierson's traction in place without skin breakdown  +TTP left thigh  LLE NV intact L1-S1 with gross motor/sensory function intact  +LLE palpable pedal pulses  Left thigh/leg compartments soft/compressible    Radiographs:  2 view left femur xrays demonstrate a displaced/comminuted femoral shaft fracture    A/P: 23year old male with a closed left displaced/comminuted femoral shaft fracture  1. OCTOR for IM nail left femur  2. Full consent for the procedure was obtained with all of the risks, benefits, and alternatives to surgical management thoroughly discussed with the patient; He voiced understanding and agreed to proceed with surgery  3. NPO; IV fluids  4. Pain control  5. DVT prophylaxis  6.  Bedrest

## 2020-01-04 NOTE — PROGRESS NOTES
Discharge instructions reviewed with the pt at this time, IVs removed and prescriptions given to the pt. All questions answered. Pt signed discharge papers, and voiced understanding. Copy of discharge papers given to the pt and a copy placed in the pt's paper chart. Pt will be going home with his mom in private transportation.   Anup Olea

## 2020-01-04 NOTE — DISCHARGE SUMMARY
Hospital Medicine Discharge Summary    Patient ID: Berneta File      Patient's PCP: Damien Flores MD    Admit Date: 1/3/2020     Discharge Date:  1/4/2020    Admitting Physician: Sindy Simons MD     Discharge Physician: Rubina Hale MD     Discharge Diagnoses: Active Hospital Problems    Diagnosis    Hip fracture, left, open type I or II, initial encounter ([facility] [S72.002B]    Closed displaced comminuted fracture of shaft of left femur ([facility] [place]       The patient was seen and examined on day of discharge and this discharge summary is in conjunction with any daily progress note from day of discharge. Hospital Course:   1. This 66-year-old male admitted with a left leg pain he was wrestling with a friend in the basement and had a fall landing on his left leg x-ray in the emergency room did show mid femur fracture  1. Nondisplaced Salter-Dejesus type 2 fracture of the femur. A second small microtrabecular    osteochondral fracture of the anterior medial femoral condyle epiphysis. 2. No meniscal tear or acute ligamentous injury.        Orthopedic was consulted is status post repair on 1/3/2020, per orthopedic patient can be discharged home today to follow-up with orthopedic as instructed. Physical Exam Performed:     /62   Pulse 87   Temp 98.6 °F (37 °C) (Oral)   Resp 18   Ht 5' 10\" (1.778 m)   Wt 135 lb (61.2 kg)   SpO2 94%   BMI 19.37 kg/m²       General appearance:  No apparent distress, appears stated age and cooperative. HEENT:  Normal cephalic, atraumatic without obvious deformity. Pupils equal, round, and reactive to light. Extra ocular muscles intact. Conjunctivae/corneas clear. Neck: Supple, with full range of motion. No jugular venous distention. Trachea midline. Respiratory:  Normal respiratory effort. Clear to auscultation, bilaterally without Rales/Wheezes/Rhonchi.   Cardiovascular:  Regular rate and rhythm with normal S1/S2 without murmurs, rubs or gallops. Abdomen: Soft, non-tender, non-distended with normal bowel sounds. Musculoskeletal:  No clubbing, cyanosis or edema bilaterally. Full range of motion without deformity. Skin: Skin color, texture, turgor normal.  No rashes or lesions. Neurologic:  Neurovascularly intact without any focal sensory/motor deficits. Cranial nerves: II-XII intact, grossly non-focal.  Psychiatric:  Alert and oriented, thought content appropriate, normal insight  Capillary Refill: Brisk,< 3 seconds   Peripheral Pulses: +2 palpable, equal bilaterally       Labs: For convenience and continuity at follow-up the following most recent labs are provided:      CBC:    Lab Results   Component Value Date    WBC 10.2 01/04/2020    HGB 10.1 01/04/2020    HCT 29.3 01/04/2020     01/04/2020       Renal:    Lab Results   Component Value Date     01/04/2020    K 4.7 01/04/2020    CL 99 01/04/2020    CO2 27 01/04/2020    BUN 12 01/04/2020    CREATININE 0.7 01/04/2020    CALCIUM 8.9 01/04/2020         Significant Diagnostic Studies    Radiology:   XR FEMUR LEFT (MIN 2 VIEWS)   Final Result      Status post ORIF of the left femur. FLUORO FOR SURGICAL PROCEDURES   Final Result      Status post ORIF of the left femur. XR FEMUR LEFT (MIN 2 VIEWS)   Final Result      Status post ORIF of the left femur. XR HIP 2-3 VW W PELVIS LEFT   Final Result     Comminuted, displaced fractures of the mid left femur.                  Consults:     IP CONSULT TO ORTHOPEDIC SURGERY  IP CONSULT TO HOSPITALIST  IP CONSULT TO ORTHOPEDIC SURGERY    Disposition: Home    Condition at Discharge: Stable    Discharge Instructions/Follow-up: Follow-up with orthopedic as instructed instructed    Code Status:  Full Code     Activity: activity as tolerated    Diet: General      Discharge Medications:     Current Discharge Medication List           Details   enoxaparin (LOVENOX) 40 MG/0.4ML injection Inject 0.4 mLs into the skin daily for 21 days  Qty: 8.4 mL, Refills: 0      PERCOCET 5-325 MG per tablet Take 1 tablet by mouth every 6 hours as needed for Pain for up to 7 days. Intended supply: 7 days. Take lowest dose possible to manage pain  Qty: 28 tablet, Refills: 0    Comments: Reduce doses taken as pain becomes manageable  Associated Diagnoses: Closed displaced comminuted fracture of shaft of left femur, initial encounter ([facility]             Time Spent on discharge is more than 35 minutes in the examination, evaluation, counseling and review of medications and discharge plan. Signed:    Shaun Laughlin MD   1/4/2020      Thank you Juan Jose Mancilla MD for the opportunity to be involved in this patient's care. If you have any questions or concerns please feel free to contact me at [phone].

## 2020-01-04 NOTE — PROGRESS NOTES
Physical Therapy    Facility/Department: [facility] [address]  Initial Assessment/Treatment/Discharge Summary     NAME: Cata Hankins  : 2000  MRN: [id number]    Date of Service: 2020    Discharge Recommendations:    Cata Hankins scored a 20/24 on the AM-PAC short mobility form. Current research shows that an AM-PAC score of 18 or greater is typically associated with a discharge to the patient's home setting. PT Equipment Recommendations  Equipment Needed: (crutches provided for pt to take/use at home)    Assessment   Assessment: 22 y/o pt s/p closed left displaced/comminuted femoral shaft fracture nailing. Pt currently moving very well - requiring CGA/SBA for mobility with use of crutches for amb and stair negotiation. Pt planning to d/c home with 24hr A from family. Pt with no concerns about d/c home. Pt with no further acute PT needs at this time. Will sign off from PT services. Prognosis: Excellent  Decision Making: Low Complexity  Patient Education: role of PT, use of call light, WBAT, crutch training, d/c planning - pt verb understanding   Barriers to Learning: none  REQUIRES PT FOLLOW UP: No  Activity Tolerance  Activity Tolerance: Patient Tolerated treatment well       Patient Diagnosis(es): The encounter diagnosis was Closed displaced comminuted fracture of shaft of left femur, initial encounter ([facility] has no past medical history on file. has no past surgical history on file. Restrictions  Position Activity Restriction  Other position/activity restrictions: WBAT, up with assist   Vision/Hearing  Vision: Within Functional Limits  Hearing: Within functional limits     Subjective  General  Chart Reviewed: Yes  Additional Pertinent Hx: 23year old male was wrestling last night with friends when he was flipped over his friend's back onto a hard floor. The patient landed on his leg in an awkward way and experienced immediate left thigh pain and inability to bear weight.   Upon 2  Rails: None  Curbs: 6\"  Device: Crutches  Assistance: Contact guard assistance;Stand by assistance     Balance  Posture: Good  Sitting - Static: Good  Sitting - Dynamic: Good  Standing - Static: Good  Standing - Dynamic: Good(with crutches)      PT evaluation and treatment initiated. Treatment included gait and transfer training as well as patient education. Plan   Plan  Times per week: d/c  Safety Devices  Type of devices: Gait belt, Chair alarm in place, Left in chair, Call light within reach, Nurse notified      AM-PAC Score  AM-PAC Inpatient Mobility Raw Score : 20 (01/04/20 1236)  AM-PAC Inpatient T-Scale Score : 47.67 (01/04/20 1236)  Mobility Inpatient CMS 0-100% Score: 35.83 (01/04/20 1236)  Mobility Inpatient CMS G-Code Modifier : CJ (01/04/20 1236)        Therapy Time   Individual Concurrent Group Co-treatment   Time In 1108         Time Out 1148         Minutes 40           Timed Code Treatment Minutes:  25    Total Treatment Minutes:  40     If the patient is discharged before the next treatment session, this note will serve as the discharge summary.      Carol Martin, PT, DPT [id number]

## 2020-01-04 NOTE — OP NOTE
Operative Note    Date:  1/3/2020    Name:  Zenaida Carter  Address:  [address] [place] [address],[address]  [address] [facility] [place] [address]    :  2000      Age:   23 y.o.    SSN:  [id number]      Medical Record Number:  [id number]    Preop Dx:  Closed left displaced/comminuted femoral shaft fracture  Postop Dx: Same  Procedure:   1. ORIF left femur with IM nail  2. Physician directed fluoroscopy >60 minutes  Blood Loss:  [id number]  Complications: None  Surgeon:  Araceli Yoon DO  Disposition:  Pacu    On the day of the operation, the patient was met in the preop holding area. The operative extremity was signed by myself and all appropriate preop paperwork was completed. The patient was taken back to the OR suite and transferred to the OR fracture table where he was positioned supine making sure to pad all bony prominences appropriately. The patient was intubated and GETA was administered without complication. Ancef 2 grams IV was administered <30 minutes prior to surgical incision for SSI prophylaxis. TXA 1 grams IV was administered for bleeding prophylaxis. An SCD was applied to the nonop extremity. The operative extremity underwent a preop alcohol scrub. Preop fluoroscopic images were taken and demonstrated acceptable alignment and reduction of the patient's femur fracture. The operative extremity was then prepped and draped in the usual sterile fashion. A final timeout was performed. A 3cm longitudinal incision was made 3 fingerbreadths proximal to the greater trochanter of left proximal femur. Dissection was carried down through the skin, sub q tissue, and fascia sharply with a 10 blade scalpel. .  Meticulous hemostasis was achieved with bovie electrocautery. A threaded tip guidewire was placed on the medial aspect of the greater trochanter and AP and Lateral fluoro imaging confirmed appropriate guidewire placement.   The guidewire was then advanced under power iinto the femoral canal.  We then used our Synthes opening reamer with soft tissue protector to ream open our entrance hole in the proximal femur. We then placed a ball-tipped guidewire into the femoral canal to the level of the fracture site. We then made 2 separate 3.5cm longitudinal incisions along the lateral aspect of the thigh in-line with the femoral shaft at the proximal and distal level of our fractures. . Dissection was carried down through the skin, sub q tissue, and fascia sharply with a 10 blade scalpel. Meticulous hemostasis was achieved with bovie electrocautery. Blunt finger dissection was carried down through the vastus lateralus musculature to obtain access to the femur fracture. 2 separate Hamilton clamps were placed around the fracture and fluoroscopic imaging confirmed near anatomic restoration of alignment. We then passed our ball tip guidewire across the fracture to the level of the distal femoral physeal scar. We measured for and opened a Synthes [id number] antegrade femoral reconstruction nail. The nail was opened and attached to it's insertion device on the sterile back table. We sequentially reamed over our guidewire to a 11.5mm flexible reamer. We then passed the nail over our guidewire with a mallet and seated it into it's final position. Fluoroscopic imaging confirmed the maintanence of our reduction. We then placed 2 proximal 5.0mm fully threaded interlocking screws through our nail's proximal aspect. We then used a perfect [place]'s technique to place 2 distal 5.0mm full threaded interlocking screws to complete our fixation construct. The insertion handle for the nail was dissassembled. Final AP and Lateral fluoro images were taken and saved to the [facility] [employer]. PACS system. These confirmed restoration of femoral length, rotation, and alignment. Of note <30 minutes of physician directed fluoro was used throughout this case for the above mentioned purposes.   Our surgical wounds were irrigated with copious amounts of

## 2020-01-04 NOTE — PROGRESS NOTES
Assessment complete, see flow sheet. Pt states pain is minimal and controled at this time, encouraged and had pt do his IS, pt demonstrated doing the IS correctly. Pt had emesis earlier but then denied the need for any antinausea medication because now he feels better. Pt currently has no needs at this time, call light in reach. Pt's aunt now at the bedside, pt's mother had to leave for awhile, pt states we are allowed to give information to both his mom and aunt.   Karen Handy

## 2020-01-04 NOTE — CARE COORDINATION
Able to afford home medications/ co-pay costs: Yes    ADLS:  Current PT AM-PAC Score: 20 /24  Current OT AM-PAC Score: 22 /24      DISCHARGE Disposition: Home- No Services Needed    LOC at discharge: Not Applicable  SIMIN Completed: Not Indicated    Notification completed in HENS/PAS?:  Not Applicable    IMM Completed:   Not Indicated    Transportation:  Transportation PLAN for discharge: family   Mode of Transport: Private Car      The Plan for Transition of Care is related to the following treatment goals of Hip fracture, left, open type I or II, [facility] [S72.002B]    The Patient and/or patient representative Manisha Avilez and his family were provided with a choice of provider and agrees with the discharge plan Not Indicated    Freedom of choice list was provided with basic dialogue that supports the patient's individualized plan of care/goals and shares the quality data associated with the providers.  Not Indicated    Care Transitions patient: No    Jerald Beal RN  [facility]  Case Management Department  Ph: [phone]  EES:[phone]

## 2020-01-04 NOTE — PROGRESS NOTES
Patient alert and oriented x4. VSS. Dressings to left thigh CDI. Pain controlled with prn and scheduled medications. Tolerating liquids, denies nausea. Voiding per urinal. In bed with alarm on and call light within reach.

## 2020-01-04 NOTE — PLAN OF CARE
Problem: Pain:  Goal: Pain level will decrease  Description  Pain level will decrease  Outcome: Ongoing  Note:   Patient's pain controlled with oral and IV pain medications per mar. Patient resting with eyes closed. Verbalizes understanding to notify staff of increases in pain level.

## 2020-01-04 NOTE — PROGRESS NOTES
Layout: Laundry in basement, Two level(7+8 steps up to bedroom )  Home Access: Stairs to enter without rails  Entrance Stairs - Number of Steps: 1 CHERYL  Bathroom Shower/Tub: Walk-in shower  Bathroom Toilet: Standard(sink for leverage)  Bathroom Equipment: Grab bars in shower, Hand-held shower, Built-in shower seat  Home Equipment: Crutches  ADL Assistance: Independent  Homemaking Assistance: Independent  Ambulation Assistance: Independent  Transfer Assistance: Independent  Active : Yes  Occupation: Full time employment  Type of occupation: [employer]  [address]: dirt biking  Additional Comments: Pt reports several family members will be able to assist at d/c       Objective   Vision: Within Functional Limits  Hearing: Within functional limits    Orientation  Overall Orientation Status: Within Normal Limits     Balance  Sitting Balance: Independent  Standing Balance: Stand by assistance(to supervision)  Standing Balance  Time: ~2 min, ~4 min  Activity: bathroom mobility/activity, walk in gant  Functional Mobility  Functional - Mobility Device: Crutches  Activity: To/from bathroom; Other  Assist Level: Stand by assistance  Toilet Transfers  Toilet - Technique: Ambulating  Equipment Used: Standard toilet(grab bar)  Toilet Transfer: Stand by assistance(to supervision)  ADL  Grooming: Modified independent   LE Dressing: Minimal assistance(Req assist for Lft sock)  Toileting: (denied need)  Tone RUE  RUE Tone: Normotonic  Tone LUE  LUE Tone: Normotonic  Coordination  Movements Are Fluid And Coordinated: Yes     Bed mobility  Supine to Sit: Modified independent(HOB elevated and use of rail)  Scooting: Independent  Transfers  Stand Step Transfers: Stand by assistance  Sit to stand: Stand by assistance(to supervision)  Stand to sit: Stand by assistance(to supervision)     Cognition  Overall Cognitive Status: WNL                 LUE AROM (degrees)  LUE AROM : WFL  RUE AROM (degrees)  RUE AROM : WFL  LUE

## 2020-12-02 ENCOUNTER — HOSPITAL ENCOUNTER (EMERGENCY)
Age: 20
Discharge: HOME | End: 2020-12-02
Payer: COMMERCIAL

## 2020-12-02 ENCOUNTER — APPOINTMENT (OUTPATIENT)
Dept: GENERAL RADIOLOGY | Age: 20
End: 2020-12-02
Payer: COMMERCIAL

## 2020-12-02 VITALS
BODY MASS INDEX: 19.6 KG/M2 | HEIGHT: 71 IN | DIASTOLIC BLOOD PRESSURE: 73 MMHG | TEMPERATURE: 97.9 F | OXYGEN SATURATION: 99 % | WEIGHT: 140 LBS | SYSTOLIC BLOOD PRESSURE: 122 MMHG | RESPIRATION RATE: 14 BRPM | HEART RATE: 64 BPM

## 2020-12-02 PROCEDURE — 73130 X-RAY EXAM OF HAND: CPT

## 2020-12-02 PROCEDURE — 6370000000 HC RX 637 (ALT 250 FOR IP): Performed by: NURSE PRACTITIONER

## 2020-12-02 PROCEDURE — 99284 EMERGENCY DEPT VISIT MOD MDM: CPT

## 2020-12-02 RX ORDER — IBUPROFEN 600 MG/1
600 TABLET ORAL ONCE
Status: COMPLETED | OUTPATIENT
Start: 2020-12-02 | End: 2020-12-02

## 2020-12-02 RX ADMIN — IBUPROFEN 600 MG: 600 TABLET, FILM COATED ORAL at 11:08

## 2020-12-02 ASSESSMENT — PAIN DESCRIPTION - PAIN TYPE: TYPE: ACUTE PAIN

## 2020-12-02 ASSESSMENT — PAIN SCALES - GENERAL
PAINLEVEL_OUTOF10: 7

## 2020-12-02 ASSESSMENT — PAIN DESCRIPTION - LOCATION: LOCATION: HAND

## 2020-12-02 ASSESSMENT — PAIN DESCRIPTION - ORIENTATION: ORIENTATION: RIGHT

## 2020-12-02 NOTE — ED PROVIDER NOTES
[facility] Emergency Department    CHIEF COMPLAINT  Hand Pain (pain right hand at base of 2nd finger. Pt slipped on the ice yesterday and caught himself with right hand. )      HISTORY OF PRESENT ILLNESS  Kathy Valle is a 21 y.o. male who presents to the ED complaining of right finger injury. Patient reports that he slipped on ice last evening and try to catch himself with his right hand that was in a closed fist.  Patient is right-hand dominant. Patient denies head injury or loss of consciousness. Patient denies any other injury. Patient denies take anything for pain prior to travel. No other complaints, modifying factors or associated symptoms. Nursing notes reviewed. History reviewed. No pertinent past medical history. Past Surgical History:   Procedure Laterality Date    FEMUR FRACTURE SURGERY Left 1/3/2020    LEFT INTRAMEDULLARY NAILING FEMUR performed by Raf Jones DO at [address] [employer] reviewed. No pertinent family history.   Social History     Socioeconomic History    Marital status: Single     Spouse name: Not on file    Number of children: Not on file    Years of education: Not on file    Highest education level: Not on file   Occupational History    Not on file   Social Needs    Financial resource strain: Not on file    Food insecurity     Worry: Not on file     Inability: Not on file    Transportation needs     Medical: Not on file     Non-medical: Not on file   Tobacco Use    Smoking status: Never Smoker    Smokeless tobacco: Never Used   Substance and Sexual Activity    Alcohol use: Yes     Comment: occassional    Drug use: Yes     Types: Marijuana     Comment: occassional    Sexual activity: Not on file   Lifestyle    Physical activity     Days per week: Not on file     Minutes per session: Not on file    Stress: Not on file   Relationships    Social connections     Talks on phone: Not on file     Gets together: Not on file Attends Confucianism service: Not on file     Active member of club or organization: Not on file     Attends meetings of clubs or organizations: Not on file     Relationship status: Not on file    Intimate partner violence     Fear of current or ex partner: Not on file     Emotionally abused: Not on file     Physically abused: Not on file     Forced sexual activity: Not on file   Other Topics Concern    Not on file   Social History Narrative    Not on file     Current Facility-Administered Medications   Medication Dose Route Frequency Provider Last Rate Last Dose    ibuprofen (ADVIL;MOTRIN) tablet 600 mg  600 mg Oral Once Amee Livingston APRN - CNP         No current outpatient medications on file. Allergies   Allergen Reactions    Penicillins        REVIEW OF SYSTEMS  6 systems reviewed, pertinent positives per HPI otherwise noted to be negative    PHYSICAL EXAM  /73   Pulse 64   Temp 97.9 °F (36.6 °C) (Oral)   Resp 14   Ht 5' 11\" (1.803 m)   Wt 140 lb (63.5 kg)   SpO2 99%   BMI 19.53 kg/m²   GENERAL APPEARANCE: Awake and alert. Cooperative. No acute distress. HEAD: Normocephalic. Atraumatic. EYES: PERRL. EOM's grossly intact. ENT: Mucous membranes are moist.   NECK: Supple. Normal ROM. CHEST: Equal symmetric chest rise. LUNGS: Breathing is unlabored. Speaking comfortably in full sentences. Abdomen: Nondistended  EXTREMITIES: MAEE. No acute deformities. Right second digit swelling at the MCP joint. No abrasion, erythema, laceration, ecchymosis. Mild tenderness palpation. Patient able to perform active and passive range of motion. Patient able to perform finger to thumb opposition. Normal strength. Sensation intact. Cap refill less than 2 seconds. Distal pulse intact. Neurovascularly intact. SKIN: Warm and dry. NEUROLOGICAL: Alert and oriented. Strength is 5/5 in all extremities and sensation is intact.     RADIOLOGY  Xr Hand Right (min 3 Views)    Result Date: 12/2/2020  EXAMINATION: THREE XRAY VIEWS OF THE RIGHT HAND 12/2/2020 10:33 am COMPARISON: None. HISTORY: ORDERING SYSTEM PROVIDED HISTORY: right hand pain TECHNOLOGIST PROVIDED HISTORY: Reason for exam:->right hand pain Reason for Exam: right hand pain Acuity: Acute Type of Exam: Initial FINDINGS: No acute fracture or dislocation is demonstrated. Visualized joint spaces and articular surfaces are within normal limits. No focal soft tissue swelling. There is a punctate radiodensity within the 2nd digit, seen on the lateral view. 1. No acute osseous abnormality of the right hand. 2. Punctate radiodensity which is either within the soft tissues of the 2nd digit or due to overlying artifact. ED COURSE/MDM  Patient seen and evaluated. Old records reviewed. Diagnostic testing reviewed and results discussed. I have independently evaluated this patient based upon my scope of practice. Supervising physician was in the department as needed for consultation. Chantell Scott presented to the ED today with above noted complaints. X-ray of the right hand shows no acute osseous abnormality. No fracture or dislocation. No focal soft tissue swelling. Patient given ibuprofen and right hand placed in an Ace wrap for compression and swelling. Neurovascular intact after Ace wrap placement. We also discussed rice. Patient was provided with orthopedic hand specialist to follow-up with as needed. Patient agreeable with this plan. While in ED patient received   Medications   ibuprofen (ADVIL;MOTRIN) tablet 600 mg (has no administration in time range)       At this point I do not feel the patient requires further work up and it is reasonable to discharge the patient. A discussion was had with the patient and/or their surrogate regarding diagnosis, diagnostic testing results, treatment/ plan of care, and follow up.  There was shared decision-making between myself as well as the patient and/or their surrogate and we are all in agreement with discharge home. There was an opportunity for questions and all questions were answered to the best of my ability and to the satisfaction of the patient and/or patient family. Patient will follow up with ortho for further evaluation/treatment. The patient was given strict return precautions as we discussed symptoms that would necessitate return to the ED. Patient will return to ED for new/worsening symptoms. The patient verbalized their understanding and agreement with the above plan. Please refer to AVS for further details regarding discharge instructions. No results found for this visit on 12/02/20. I estimate there is LOW risk for COMPARTMENT SYNDROME, DEEP VENOUS THROMBOSIS, SEPTIC ARTHRITIS, TENDON OR NEUROVASCULAR INJURY, thus I consider the discharge disposition reasonable. Panda Dowling and I have discussed the diagnosis and risks, and we agree with discharging home to follow-up with their primary doctor or the referral orthopedist. We also discussed returning to the Emergency Department immediately if new or worsening symptoms occur. We have discussed the symptoms which are most concerning (e.g., changing or worsening pain, numbness, weakness) that necessitate immediate return. Final Impression    1. Finger injury, right, initial encounter        Blood pressure 122/73, pulse 64, temperature 97.9 °F (36.6 °C), temperature source Oral, resp. rate 14, height 5' 11\" (1.803 m), weight 140 lb (63.5 kg), SpO2 99 %.mdm    Patient was sent home with a prescription for below medication/s. I did [place] patient on appropriate use of these medication.   New Prescriptions    No medications on file       FOLLOW Yumi David MD  [facility] [address] [place] [address] [address] [phone]      As needed    [facility]  ED  [facility]  [address] [phone]          DISPOSITION  Patient was discharged to home in good condition. Comment: Please note this report has been produced using speech recognition software and may contain errors related to that system including errors in grammar, punctuation, and spelling, as well as words and phrases that may be inappropriate. If there are any questions or concerns please feel free to contact the dictating provider for clarification.         LETTY Nick - MARCK  12/02/20 1103

## 2020-12-02 NOTE — ED NOTES
Applied 2 inch ace wrap to pt's right hand. Pt tolerated well and acknowledged all care taking instructions.      Jam Villanueva  12/02/20 1103

## 2023-02-10 ENCOUNTER — OFFICE VISIT (OUTPATIENT)
Dept: PRIMARY CARE CLINIC | Age: 23
End: 2023-02-10

## 2023-02-10 VITALS
WEIGHT: 162.6 LBS | DIASTOLIC BLOOD PRESSURE: 83 MMHG | SYSTOLIC BLOOD PRESSURE: 126 MMHG | HEART RATE: 70 BPM | BODY MASS INDEX: 22.68 KG/M2

## 2023-02-10 DIAGNOSIS — Z13.220 SCREENING FOR HYPERLIPIDEMIA: ICD-10-CM

## 2023-02-10 DIAGNOSIS — F41.9 ANXIETY: ICD-10-CM

## 2023-02-10 DIAGNOSIS — Z00.00 ENCOUNTER FOR WELL ADULT EXAM WITHOUT ABNORMAL FINDINGS: ICD-10-CM

## 2023-02-10 DIAGNOSIS — Z11.59 NEED FOR HEPATITIS C SCREENING TEST: ICD-10-CM

## 2023-02-10 DIAGNOSIS — Z88.0 PENICILLIN ALLERGY: Primary | ICD-10-CM

## 2023-02-10 DIAGNOSIS — Z11.4 SCREENING FOR HIV WITHOUT PRESENCE OF RISK FACTORS: ICD-10-CM

## 2023-02-10 PROBLEM — D49.9 NEOPLASTIC DISEASE: Status: ACTIVE | Noted: 2023-02-10

## 2023-02-10 PROBLEM — F91.3 OPPOSITIONAL DEFIANT DISORDER: Status: ACTIVE | Noted: 2017-01-20

## 2023-02-10 RX ORDER — ESCITALOPRAM OXALATE 10 MG/1
10 TABLET ORAL DAILY
Qty: 30 TABLET | Refills: 3 | Status: SHIPPED | OUTPATIENT
Start: 2023-02-10

## 2023-02-10 SDOH — HEALTH STABILITY: PHYSICAL HEALTH: ON AVERAGE, HOW MANY DAYS PER WEEK DO YOU ENGAGE IN MODERATE TO STRENUOUS EXERCISE (LIKE A BRISK WALK)?: 2 DAYS

## 2023-02-10 ASSESSMENT — ENCOUNTER SYMPTOMS
CONSTIPATION: 0
SHORTNESS OF BREATH: 0
SORE THROAT: 0
DIARRHEA: 0
COLOR CHANGE: 0
RHINORRHEA: 0
VOMITING: 0
EYE PAIN: 0
ABDOMINAL PAIN: 0
NAUSEA: 0
COUGH: 0

## 2023-02-10 ASSESSMENT — ANXIETY QUESTIONNAIRES
GAD7 TOTAL SCORE: 13
2. NOT BEING ABLE TO STOP OR CONTROL WORRYING: 1
5. BEING SO RESTLESS THAT IT IS HARD TO SIT STILL: 1
6. BECOMING EASILY ANNOYED OR IRRITABLE: 1
7. FEELING AFRAID AS IF SOMETHING AWFUL MIGHT HAPPEN: 2
1. FEELING NERVOUS, ANXIOUS, OR ON EDGE: 2
4. TROUBLE RELAXING: 3
3. WORRYING TOO MUCH ABOUT DIFFERENT THINGS: 3

## 2023-02-10 ASSESSMENT — PATIENT HEALTH QUESTIONNAIRE - PHQ9
1. LITTLE INTEREST OR PLEASURE IN DOING THINGS: 0
SUM OF ALL RESPONSES TO PHQ QUESTIONS 1-9: 0
SUM OF ALL RESPONSES TO PHQ QUESTIONS 1-9: 0
SUM OF ALL RESPONSES TO PHQ9 QUESTIONS 1 & 2: 0
2. FEELING DOWN, DEPRESSED OR HOPELESS: 0
SUM OF ALL RESPONSES TO PHQ QUESTIONS 1-9: 0
SUM OF ALL RESPONSES TO PHQ QUESTIONS 1-9: 0

## 2023-02-10 ASSESSMENT — SOCIAL DETERMINANTS OF HEALTH (SDOH)
WITHIN THE LAST YEAR, HAVE YOU BEEN KICKED, HIT, SLAPPED, OR OTHERWISE PHYSICALLY HURT BY YOUR PARTNER OR EX-PARTNER?: NO
WITHIN THE LAST YEAR, HAVE YOU BEEN AFRAID OF YOUR PARTNER OR EX-PARTNER?: NO
WITHIN THE LAST YEAR, HAVE TO BEEN RAPED OR FORCED TO HAVE ANY KIND OF SEXUAL ACTIVITY BY YOUR PARTNER OR EX-PARTNER?: NO
WITHIN THE LAST YEAR, HAVE YOU BEEN HUMILIATED OR EMOTIONALLY ABUSED IN OTHER WAYS BY YOUR PARTNER OR EX-PARTNER?: NO

## 2023-02-10 NOTE — PROGRESS NOTES
Well Adult Note  Name: Maggie Keith Date: 2/10/2023   MRN: [id number] Sex: Male   Age: 21 y.o. Ethnicity: Non- / Non    : 2000 Race: White (non-)      Quinten Dunn is here for well adult exam.  History:  ***      Review of Systems    Allergies   Allergen Reactions    Penicillins          Prior to Visit Medications    Not on File       History reviewed. No pertinent past medical history. Past Surgical History:   Procedure Laterality Date    FEMUR FRACTURE SURGERY Left 1/3/2020    LEFT INTRAMEDULLARY NAILING FEMUR performed by Gordo Barrientos DO at [facility] [place]         Family History   Problem Relation Age of Onset    No Known Problems Mother     No Known Problems Father     No Known Problems Sister     No Known Problems Sister     No Known Problems Brother        Social History     Tobacco Use    Smoking status: Never    Smokeless tobacco: Never   Vaping Use    Vaping Use: Every day    Substances: Nicotine   Substance Use Topics    Alcohol use: Yes     Alcohol/week: 30.0 standard drinks     Types: 30 Cans of beer per week     Comment: occassional    Drug use: Yes     Types: Marijuana (Weed)     Comment: occassional       Objective   /83   Pulse 70   Wt 162 lb 9.6 oz (73.8 kg)   BMI 22.68 kg/m²   Wt Readings from Last 3 Encounters:   02/10/23 162 lb 9.6 oz (73.8 kg)   20 140 lb (63.5 kg)   20 135 lb (61.2 kg) (17 %, Z= -0.94)*     * Growth percentiles are based on [facility] (Boys, 2-20 Years) data. There were no vitals filed for this visit. Physical Exam      Assessment   Plan   1. Penicillin allergy  ***    2. Encounter for well adult exam without abnormal findings  ***    3. Screening for hyperlipidemia  ***  - Lipid Panel; Future  - Comprehensive Metabolic Panel; Future    4. Screening for HIV without presence of risk factors  ***  - HIV Screen; Future    5.  Need for hepatitis C screening test  ***  - Hepatitis C Antibody; Future      Personalized Preventive Plan   Current Health Maintenance Status  Immunization History   Administered Date(s) Administered    COVID-19, PFIZER PURPLE top, DILUTE for use, (age 15 y+), 30mcg/0.3mL 08/25/2021, 09/15/2021    DTaP vaccine 01/31/2002, 10/21/2004    HPV Quadrivalent (Gardasil) 04/14/2014, 12/04/2014    Hep B/Hib (Comvax) 02/01/2001    Hepatitis A Ped/Adol (Havrix, Vaqta) 02/26/2009, 03/01/2010    Influenza A ([id number]) Vaccine Nasal 11/03/2009, 12/17/2009    MMR 08/25/2001, 10/21/2004    Meningococcal MCV4P (Menactra) 02/21/2011    Pneumococcal Conjugate 7-valent (Dl Banana) 2000, 08/25/2001    Polio IPV (IPOL) 01/31/2002, 10/21/2004    Tdap (Boostrix, Adacel) 02/21/2011    Varicella (Varivax) 02/01/2001, 01/31/2008        Health Maintenance   Topic Date Due    HIV screen  Never done    Hepatitis C screen  Never done    DTaP/Tdap/Td vaccine (4 - Td or Tdap) 02/21/2021    COVID-19 Vaccine (3 - Booster for Pfizer series) 11/10/2021    Flu vaccine (1) 08/01/2022    Depression Screen  02/10/2024    Hepatitis A vaccine  Completed    HPV vaccine  Completed    Varicella vaccine  Completed    Hib vaccine  Aged Out    Meningococcal (ACWY) vaccine  Aged Out    Pneumococcal 0-64 years Vaccine  Aged Out     Recommendations for [place] Due: see orders and patient instructions/AVS.    No follow-ups on file.

## 2023-02-10 NOTE — PATIENT INSTRUCTIONS
Barrington Marte - [facility]  12 Rule for Life - Darlin Lewis      4 -7- 8 Breath Relaxation Exercise To manage Stress/Anxiety    Anyone can do it. .. Simple    Quick    No equipment needed    Do it anywhere    Are the numbers important? The absolute time you spend on each phase is not important; the ratio of 4:7:8 is important. If you have trouble  holding your breath, speed the exercise up but keep to  the ratio of 4:7:8 for the three phases. With practice you  can slow it all down and get used to inhaling and exhaling more and more deeply. Why should I do it? This exercise is a natural tranquilizer for the nervous system. Unlike tranquilizing drugs, which are often effective  when you first take them but then lose their power over  time, this exercise is subtle when you first try it but gains  in power with repetition and practice. Use this new skill  whenever anything upsetting happens - before you react. Use it whenever you are aware of internal tension. Use it  to help you fall asleep. How often? Do it at least twice a day. You cannot do it too frequently. Do not do more than four breaths at one time for the first  month of practice. Later, if you wish, you can extend it to  eight breaths. If you feel a little lightheaded when you  first breathe this way, do not be concerned - it will pass. STEPS  Exhale completely through your mouth, making a whoosh sound. Close your mouth and inhale quietly through your nose to a mental count of 4. Hold your breath for a count of 7. Exhale completely through your mouth, making a whoosh sound to a count of 8. This is one breath. Now inhale again and repeat the cycle three more times for a total of four breaths. Beginner Tips:  Ideally, sit with your back straight. Place the tip of your tongue against the ridge of tissue just  behind your upper front teeth, and keep it there through the  entire exercise.   Exhale through your mouth around your tongue; try pursing  your lips slightly if this seems awkward  WELL ADULT LIFESTYLE INSTRUCTIONS:    Pick a day in the next week to spend an hour reviewing the information below then:     1) determine your health goals for the year   2) determine what changes you need to achieve those goals   3) design your daily routine, shopping habits etc to implement those changes        Default Right Action (no choices)       Make it EASY to do the RIGHT THINGS.   4) I invite you to send me your plans via [employer] so I can continue to help you       with them    Examine your lifestyle with an emphasis on BARRIERS to bad and good habits and how you can design your life to make better choices.    If you want to feel better these are the FUNDAMENTAL PILLARS of Wellness:    1)  You can choose to Get 150 min/week of moderate exercise (can talk but can't        sing) or 75 min/week of vigorous exercise (can't talk).   This will enhance your sense of well being (Exercise is as good as medicine for   depression.)    2)  You can choose to Get 7-9 hours of sleep per night    Detoxifies your brain, reduces risk of dementia    3)  You can choose to Strength Train 2 x a week on non-consecutive days   This will improve function and reduce risk of injury.   Body weight type exercises   such as Yoga and Pilates are excellent choices.    4)  You can choose Good Nutrition.  Only eat your goal weight (in lbs) x 10        calories/day and get 5 servings of Vegetables/day   Plant based diets reduce risk of heart attack/stroke and will help you feel full on   less food.   Avoid highly processed foods and processed carbohydrates.    5)  You can choose Moderate alcohol intake < 1-2 drinks/day   Alcohol will disrupt your sleep and add calories to your day.    6)  You can choose to Develop a Charismatic/Supportive relationship.     This will strengthen your resilience for the ups and downs.    7)  You can choose to Practice Mindfulness.     An hour a day of  prayer/meditation/gratitude will change your life! If you are trying to lose weight, here are some recommendations for weight loss:  Not every weight loss program is appropriate for everybody. ..  good online sources include Noom (more social with daily check ins), Lifesum (similar but less social) and Naturally slim, as well as Brandneu ($1500)    The GI Diet or \"Primal diet\", Intermittent fasting can also be effective choices. If you have diabetes treated with insulin be sure to ask for specific guidance around meals. Take your desired weight in pounds and multiply by 10 and that is your average daily calorie allowance. For example if you wish to weigh 170 lb x 10 = 1700 justin/day (this is how to gradually lose the weight and maintain your desired weight). Avoid soda/coke and all \"wet carbs\" => Drink ice water instead    Drink a large glass of ice water before meals and EAT SLOWLY (talk while you eat)! Rethink your hunger => it means your losing weight.     Minimize highly processed carbohydrates as they stimulate your appetite:  Specifically cut back on Bread, Rice, Pasta and Potatoes    Avoid eating calories after 6 pm

## 2023-02-10 NOTE — PROGRESS NOTES
Chief Complaint   Patient presents with    [facility] for evaluation and management of well adult check and anxiety. Mumtaz Schultz notes that he is a  for [employer] a family business that does [employer]. He has a steady girlfriend. He is going to high school but not college. He is not working out very much. He drinks about 30 beers a week, he vapes nicotine and smokes marijuana consistently to help him manage anxiety. He notes he is anxious about work his girlfriend and family relationships. He is so anxious that it impairs his ability to function. He has been using marijuana to help manage his anxiety but to not good effect. BOOKER 7 SCORE 2/10/2023   BOOKER-7 Total Score 13     Interpretation of BOOKER-7 score: 5-9 = mild anxiety, 10-14 = moderate anxiety, 15+ = severe anxiety. Recommend referral to behavioral health for scores 10 or greater. Review of Systems   Constitutional:  Negative for chills and fever. HENT:  Negative for ear pain, rhinorrhea and sore throat. Eyes:  Negative for pain and visual disturbance. Respiratory:  Negative for cough and shortness of breath. Cardiovascular:  Negative for chest pain and palpitations. Gastrointestinal:  Negative for abdominal pain, constipation, diarrhea, nausea and vomiting. Genitourinary:  Negative for dysuria and frequency. Musculoskeletal:  Negative for joint swelling and myalgias. Skin:  Negative for color change and rash. Neurological:  Negative for weakness, numbness and headaches. Hematological:  Negative for adenopathy. Does not bruise/bleed easily. Psychiatric/Behavioral:  Negative for dysphoric mood, self-injury and suicidal ideas. The patient is not nervous/anxious. Allergies   Allergen Reactions    Penicillins      New Prescriptions    No medications on file     No current outpatient medications on file.      No current facility-administered medications for this visit. History reviewed. No pertinent past medical history. Past Surgical History:   Procedure Laterality Date    FEMUR FRACTURE SURGERY Left 1/3/2020    LEFT INTRAMEDULLARY NAILING FEMUR performed by Juan Elaine DO at [facility] OR     Family History   Problem Relation Age of Onset    No Known Problems Mother     No Known Problems Father     No Known Problems Sister     No Known Problems Sister     No Known Problems Brother      Social History     Tobacco Use    Smoking status: Never    Smokeless tobacco: Never   Vaping Use    Vaping Use: Every day    Substances: Nicotine   Substance Use Topics    Alcohol use: Yes     Alcohol/week: 30.0 standard drinks     Types: 30 Cans of beer per week     Comment: occassional    Drug use: Yes     Types: Marijuana (Weed)     Comment: occassional       Objective   /83   Pulse 70   Wt 162 lb 9.6 oz (73.8 kg)   BMI 22.68 kg/m²   Wt Readings from Last 3 Encounters:   02/10/23 162 lb 9.6 oz (73.8 kg)   12/02/20 140 lb (63.5 kg)   01/03/20 135 lb (61.2 kg) (17 %, Z= -0.94)*     * Growth percentiles are based on CDC (Boys, 2-20 Years) data. Physical Exam  Constitutional:       Appearance: He is well-developed. HENT:      Head: Normocephalic and atraumatic. Nose: Nose normal.      Mouth/Throat:      Pharynx: No oropharyngeal exudate. Eyes:      General: No scleral icterus. Right eye: No discharge. Left eye: No discharge. Pupils: Pupils are equal, round, and reactive to light. Neck:      Thyroid: No thyromegaly. Cardiovascular:      Rate and Rhythm: Normal rate and regular rhythm. Pulses:           Dorsalis pedis pulses are 2+ on the right side and 2+ on the left side. Posterior tibial pulses are 2+ on the right side and 2+ on the left side. Heart sounds: Normal heart sounds. No murmur heard. No friction rub. No gallop.       Comments: No Edema Lower Extremities  Pulmonary:      Effort: Pulmonary effort is normal. Breath sounds: Normal breath sounds. No wheezing or rales. Abdominal:      General: Bowel sounds are normal. There is no distension. Palpations: Abdomen is soft. There is no hepatomegaly or splenomegaly. Tenderness: There is no abdominal tenderness. There is no guarding or rebound. Musculoskeletal:         General: No tenderness or deformity. Normal range of motion. Cervical back: Normal range of motion and neck supple. Lymphadenopathy:      Cervical: No cervical adenopathy. Skin:     General: Skin is warm and dry. Findings: No erythema or rash. Neurological:      Mental Status: He is alert. Cranial Nerves: No cranial nerve deficit. Sensory: No sensory deficit. Gait: Gait normal.   Psychiatric:         Speech: Speech normal.         Behavior: Behavior normal.         Chemistry        Component Value Date/Time     01/04/2020 0624    K 4.7 01/04/2020 0624    CL 99 01/04/2020 0624    CO2 27 01/04/2020 0624    BUN 12 01/04/2020 0624    CREATININE 0.7 (L) 01/04/2020 0624        Component Value Date/Time    CALCIUM 8.9 01/04/2020 0624          Lab Results   Component Value Date    WBC 10.2 01/04/2020    HGB 10.1 (L) 01/04/2020    HCT 29.3 (L) 01/04/2020    MCV 88.2 01/04/2020     01/04/2020     No results found for: LABA1C  No results found for: EAG  No results found for: LABA1C  No components found for: CHLPL  No results found for: TRIG  No results found for: HDL  No results found for: LDLCALC  No results found for: LABVLDL      Assessment   Plan   1. Penicillin allergy  Referred for evaluation at [facility] allergy group  - ID BEHAV ASSMT W/SCORE & DOCD/STAND INSTRUMENT  - Amb External Referral To Allergy    2. Encounter for well adult exam without abnormal findings  Appears well:  Counselled diet, development, anticipatory guidance and safety issues.   Recommended 12 rules for life and reading Stan Perez's    3. Screening for hyperlipidemia    - Lipid Panel; Future  - Comprehensive Metabolic Panel; Future    4. Screening for HIV without presence of risk factors    - HIV Screen; Future    5. Need for hepatitis C screening test    - Hepatitis C Antibody; Future    6. Anxiety  Counseled patient to attempt to quit using marijuana as I think it is exacerbating his anxiety. We will trial Lexapro and I gave him 478 breathing tips today. Follow-up in 1 month or sooner if needed  - escitalopram (LEXAPRO) 10 MG tablet; Take 1 tablet by mouth daily  Dispense: 30 tablet;  Refill: 3      Health Maintenance   Topic Date Due    HIV screen  Never done    Hepatitis C screen  Never done    DTaP/Tdap/Td vaccine (4 - Td or Tdap) 02/21/2021    COVID-19 Vaccine (3 - Booster for Pfizer series) 11/10/2021    Flu vaccine (1) 08/01/2022    Depression Screen  02/10/2024    Hepatitis A vaccine  Completed    HPV vaccine  Completed    Varicella vaccine  Completed    Hib vaccine  Aged Out    Meningococcal (ACWY) vaccine  Aged Out    Pneumococcal 0-64 years Vaccine  Aged Out       RTC 1 month and as needed

## 2023-02-21 ENCOUNTER — PATIENT MESSAGE (OUTPATIENT)
Dept: PRIMARY CARE CLINIC | Age: 23
End: 2023-02-21

## 2023-02-21 NOTE — TELEPHONE ENCOUNTER
From: Lilly Tinoco  To: Dr. Danisha Del Cid: 2/21/2023 12:43 PM EST  Subject: [id number] [facility] makes me very nauseous and causes me to vomit.

## 2023-03-24 ENCOUNTER — OFFICE VISIT (OUTPATIENT)
Dept: PRIMARY CARE CLINIC | Age: 23
End: 2023-03-24
Payer: COMMERCIAL

## 2023-03-24 VITALS
TEMPERATURE: 97.8 F | WEIGHT: 164.8 LBS | HEART RATE: 62 BPM | HEIGHT: 71 IN | SYSTOLIC BLOOD PRESSURE: 117 MMHG | BODY MASS INDEX: 23.07 KG/M2 | DIASTOLIC BLOOD PRESSURE: 58 MMHG

## 2023-03-24 DIAGNOSIS — F41.9 ANXIETY: Primary | ICD-10-CM

## 2023-03-24 PROCEDURE — 99213 OFFICE O/P EST LOW 20 MIN: CPT | Performed by: FAMILY MEDICINE

## 2023-03-24 SDOH — ECONOMIC STABILITY: FOOD INSECURITY: WITHIN THE PAST 12 MONTHS, THE FOOD YOU BOUGHT JUST DIDN'T LAST AND YOU DIDN'T HAVE MONEY TO GET MORE.: PATIENT DECLINED

## 2023-03-24 SDOH — ECONOMIC STABILITY: FOOD INSECURITY: WITHIN THE PAST 12 MONTHS, YOU WORRIED THAT YOUR FOOD WOULD RUN OUT BEFORE YOU GOT MONEY TO BUY MORE.: PATIENT DECLINED

## 2023-03-24 SDOH — ECONOMIC STABILITY: TRANSPORTATION INSECURITY
IN THE PAST 12 MONTHS, HAS LACK OF TRANSPORTATION KEPT YOU FROM MEETINGS, WORK, OR FROM GETTING THINGS NEEDED FOR DAILY LIVING?: PATIENT DECLINED

## 2023-03-24 SDOH — ECONOMIC STABILITY: HOUSING INSECURITY
IN THE LAST 12 MONTHS, WAS THERE A TIME WHEN YOU DID NOT HAVE A STEADY PLACE TO SLEEP OR SLEPT IN A SHELTER (INCLUDING NOW)?: PATIENT REFUSED

## 2023-03-24 SDOH — ECONOMIC STABILITY: INCOME INSECURITY: HOW HARD IS IT FOR YOU TO PAY FOR THE VERY BASICS LIKE FOOD, HOUSING, MEDICAL CARE, AND HEATING?: PATIENT DECLINED

## 2023-03-24 ASSESSMENT — ANXIETY QUESTIONNAIRES
GAD7 TOTAL SCORE: 5
2. NOT BEING ABLE TO STOP OR CONTROL WORRYING: 0
7. FEELING AFRAID AS IF SOMETHING AWFUL MIGHT HAPPEN: 0
5. BEING SO RESTLESS THAT IT IS HARD TO SIT STILL: 2
1. FEELING NERVOUS, ANXIOUS, OR ON EDGE: 1
4. TROUBLE RELAXING: 1
3. WORRYING TOO MUCH ABOUT DIFFERENT THINGS: 1
6. BECOMING EASILY ANNOYED OR IRRITABLE: 0

## 2023-03-24 NOTE — PROGRESS NOTES
99 02/10/2023 1456    CO2 25 02/10/2023 1456    BUN 12 02/10/2023 1456    CREATININE 0.8 (L) 02/10/2023 1456        Component Value Date/Time    CALCIUM 10.2 02/10/2023 1456    ALKPHOS 72 02/10/2023 1456    AST 13 (L) 02/10/2023 1456    ALT 11 02/10/2023 1456    BILITOT 0.4 02/10/2023 1456          Lab Results   Component Value Date    WBC 10.2 01/04/2020    HGB 10.1 (L) 01/04/2020    HCT 29.3 (L) 01/04/2020    MCV 88.2 01/04/2020     01/04/2020     No results found for: LABA1C  No results found for: EAG  No results found for: LABA1C  No components found for: CHLPL  Lab Results   Component Value Date    TRIG 105 02/10/2023     Lab Results   Component Value Date    HDL 51 02/10/2023     Lab Results   Component Value Date    LDLCALC 115 (H) 02/10/2023     Lab Results   Component Value Date    LABVLDL 21 02/10/2023         Assessment   Plan   1. Anxiety  Counseled patient to work on 478 breathing exercises. Offered him referral for therapy he defers this today. Counseled that if his anxiety does worsen that he should restart his medicine at a lower dose and to follow-up with me to do so      RTC Return if symptoms worsen or fail to improve.

## (undated) DEVICE — 6619 2 PTNT ISO SYS INCISE AREA&LT;(&GT;&&LT;)&GT;P: Brand: STERI-DRAPE™ IOBAN™ 2

## (undated) DEVICE — SUTURE MCRYL SZ 3-0 L27IN ABSRB UD L24MM PS-1 3/8 CIR PRIM Y936H

## (undated) DEVICE — 1010 S-DRAPE TOWEL DRAPE 10/BX: Brand: STERI-DRAPE™

## (undated) DEVICE — SURE SET-DOUBLE BASIN-LF: Brand: MEDLINE INDUSTRIES, INC.

## (undated) DEVICE — SYRINGE IRRIG 60ML SFT PLIABLE BLB EZ TO GRP 1 HND USE W/

## (undated) DEVICE — Z DISCONTINUED USE 2429233 DRESSING FOAM W10XL10CM 5 LAYR SELF ADH VERSATILE SAFETAC

## (undated) DEVICE — PLATE ES AD W 9FT CRD 2

## (undated) DEVICE — PAD DRY FLOOR ABS 32X58IN GRN

## (undated) DEVICE — SUTURE VCRL SZ 2-0 L18IN ABSRB UD CT-1 L36MM 1/2 CIR J839D

## (undated) DEVICE — BANDAGE COBAN 4 IN COMPR W4INXL5YD FOAM COHESIVE QUIK STK SELF ADH SFT

## (undated) DEVICE — DRESSING FOAM SELF ADH 20X10 CM ABSORBENT MEPILEX BORDER

## (undated) DEVICE — SPONGE GZ W4XL8IN COT WVN 12 PLY

## (undated) DEVICE — GLOVE SURG SZ 8 L12IN FNGR THK87MIL DK GRN LTX POLYMER W

## (undated) DEVICE — SUTURE MCRYL SZ 4-0 L27IN ABSRB UD L19MM PS-2 1/2 CIR PRIM Y426H

## (undated) DEVICE — INTENDED FOR TISSUE SEPARATION, AND OTHER PROCEDURES THAT REQUIRE A SHARP SURGICAL BLADE TO PUNCTURE OR CUT.: Brand: BARD-PARKER ® CARBON RIB-BACK BLADES

## (undated) DEVICE — TUBING, SUCTION, 1/4" X 12', STRAIGHT: Brand: MEDLINE

## (undated) DEVICE — ELECTROSURGICAL PENCIL ROCKER SWITCH NON COATED BLADE ELECTRODE 10 FT (3 M) CORD HOLSTER: Brand: MEGADYNE

## (undated) DEVICE — COVER LT HNDL BLU PLAS

## (undated) DEVICE — ORTHO PRE OP PACK: Brand: MEDLINE INDUSTRIES, INC.

## (undated) DEVICE — SPONGE GZ W4XL4IN COT 12 PLY TYP VII WVN C FLD DSGN

## (undated) DEVICE — ROD RMR L950MM DIA3MM W/ STR BALL TIP

## (undated) DEVICE — CUFF RESTRN WRST OR ANK 45FT AD FOAM

## (undated) DEVICE — BIT DRL L330MM DIA4.2MM CALIB 100MM 3 FLUT QUIK CPL

## (undated) DEVICE — DRAPE C ARM UNIV W41XL74IN CLR PLAS XR VELC CLSR POLY STRP

## (undated) DEVICE — SOLUTION IV 1000ML 0.9% SOD CHL

## (undated) DEVICE — DRESSING FOAM W10XL10CM SIL THN LO PROF SAFETAC BORD

## (undated) DEVICE — 3M™ STERI-DRAPE™ U-DRAPE 1015: Brand: STERI-DRAPE™

## (undated) DEVICE — SUTURE VCRL SZ 0 L18IN ABSRB UD L36MM CT-1 1/2 CIR J840D

## (undated) DEVICE — DISPOSABLE PERINEAL POST PAD 9" STD. (2") POST 1 DOZEN: Brand: SCHAERER MEDICAL USA

## (undated) DEVICE — Device

## (undated) DEVICE — SPONGE,LAP,18"X18",DLX,XR,ST,5/PK,40/PK: Brand: MEDLINE

## (undated) DEVICE — JEWISH HOSPITAL TURNOVER KIT: Brand: MEDLINE INDUSTRIES, INC.

## (undated) DEVICE — SURGICAL SET UP - SURE SET: Brand: MEDLINE INDUSTRIES, INC.

## (undated) DEVICE — Z DISCONTINUED GLOVE SURG SZ 7.5 L12IN FNGR THK13MIL WHT ISOLEX

## (undated) DEVICE — GOWN,SIRUS,POLYRNF,BRTHSLV,XL,30/CS: Brand: MEDLINE

## (undated) DEVICE — YANKAUER,OPEN TIP,W/O VENT,STERILE: Brand: MEDLINE INDUSTRIES, INC.

## (undated) DEVICE — BLANKET WRM W29.9XL79.1IN UP BODY FORC AIR MISTRAL-AIR

## (undated) DEVICE — BIT DRL L145MM DIA4.2MM NONSTERILE 3 FLUT NDL PNT QUIK CPL

## (undated) DEVICE — CHLORAPREP 26ML ORANGE

## (undated) DEVICE — GUIDEWIRE ORTH L400MM DIA3.2MM FOR TFN

## (undated) DEVICE — SHEET,DRAPE,3/4,53X77,STERILE: Brand: MEDLINE

## (undated) DEVICE — DRESSING FOAM W4XL12IN SIL RECT ADH WTRPRF FLM BK W/ BORD

## (undated) DEVICE — STRIP,CLOSURE,WOUND,MEDI-STRIP,1/2X4: Brand: MEDLINE